# Patient Record
Sex: FEMALE | Race: WHITE | NOT HISPANIC OR LATINO | Employment: OTHER | URBAN - METROPOLITAN AREA
[De-identification: names, ages, dates, MRNs, and addresses within clinical notes are randomized per-mention and may not be internally consistent; named-entity substitution may affect disease eponyms.]

---

## 2017-02-16 ENCOUNTER — APPOINTMENT (OUTPATIENT)
Dept: LAB | Facility: CLINIC | Age: 77
End: 2017-02-16
Payer: MEDICARE

## 2017-02-16 ENCOUNTER — TRANSCRIBE ORDERS (OUTPATIENT)
Dept: LAB | Facility: CLINIC | Age: 77
End: 2017-02-16

## 2017-02-16 DIAGNOSIS — K28.9 GASTROINTESTINAL ULCER: ICD-10-CM

## 2017-02-16 DIAGNOSIS — E03.9 UNSPECIFIED HYPOTHYROIDISM: Primary | ICD-10-CM

## 2017-02-16 LAB
ALBUMIN SERPL BCP-MCNC: 3.7 G/DL (ref 3.5–5)
ALP SERPL-CCNC: 78 U/L (ref 46–116)
ALT SERPL W P-5'-P-CCNC: 23 U/L (ref 12–78)
ANION GAP SERPL CALCULATED.3IONS-SCNC: 6 MMOL/L (ref 4–13)
AST SERPL W P-5'-P-CCNC: 15 U/L (ref 5–45)
BILIRUB SERPL-MCNC: 0.28 MG/DL (ref 0.2–1)
BUN SERPL-MCNC: 12 MG/DL (ref 5–25)
CALCIUM SERPL-MCNC: 9.1 MG/DL (ref 8.3–10.1)
CHLORIDE SERPL-SCNC: 105 MMOL/L (ref 100–108)
CO2 SERPL-SCNC: 30 MMOL/L (ref 21–32)
CREAT SERPL-MCNC: 0.9 MG/DL (ref 0.6–1.3)
ERYTHROCYTE [DISTWIDTH] IN BLOOD BY AUTOMATED COUNT: 15.4 % (ref 11.6–15.1)
GFR SERPL CREATININE-BSD FRML MDRD: >60 ML/MIN/1.73SQ M
GLUCOSE SERPL-MCNC: 99 MG/DL (ref 65–140)
HCT VFR BLD AUTO: 38.1 % (ref 34.8–46.1)
HGB BLD-MCNC: 12.2 G/DL (ref 11.5–15.4)
MCH RBC QN AUTO: 29 PG (ref 26.8–34.3)
MCHC RBC AUTO-ENTMCNC: 32 G/DL (ref 31.4–37.4)
MCV RBC AUTO: 91 FL (ref 82–98)
PLATELET # BLD AUTO: 400 THOUSANDS/UL (ref 149–390)
PMV BLD AUTO: 10.7 FL (ref 8.9–12.7)
POTASSIUM SERPL-SCNC: 4.2 MMOL/L (ref 3.5–5.3)
PROT SERPL-MCNC: 7.5 G/DL (ref 6.4–8.2)
RBC # BLD AUTO: 4.2 MILLION/UL (ref 3.81–5.12)
SODIUM SERPL-SCNC: 141 MMOL/L (ref 136–145)
T4 FREE SERPL-MCNC: 1.07 NG/DL (ref 0.76–1.46)
TSH SERPL DL<=0.05 MIU/L-ACNC: 0.88 UIU/ML (ref 0.36–3.74)
WBC # BLD AUTO: 4.74 THOUSAND/UL (ref 4.31–10.16)

## 2017-02-16 PROCEDURE — 36415 COLL VENOUS BLD VENIPUNCTURE: CPT

## 2017-02-16 PROCEDURE — 84443 ASSAY THYROID STIM HORMONE: CPT

## 2017-02-16 PROCEDURE — 85027 COMPLETE CBC AUTOMATED: CPT

## 2017-02-16 PROCEDURE — 84439 ASSAY OF FREE THYROXINE: CPT

## 2017-02-16 PROCEDURE — 80053 COMPREHEN METABOLIC PANEL: CPT

## 2017-10-24 ENCOUNTER — APPOINTMENT (OUTPATIENT)
Dept: LAB | Facility: CLINIC | Age: 77
End: 2017-10-24
Payer: MEDICARE

## 2017-10-24 DIAGNOSIS — I51.9 MYXEDEMA HEART DISEASE: Primary | ICD-10-CM

## 2017-10-24 DIAGNOSIS — E03.9 MYXEDEMA HEART DISEASE: Primary | ICD-10-CM

## 2017-10-24 LAB
T4 FREE SERPL-MCNC: 0.8 NG/DL (ref 0.76–1.46)
TSH SERPL DL<=0.05 MIU/L-ACNC: 2.02 UIU/ML (ref 0.36–3.74)

## 2017-10-24 PROCEDURE — 36415 COLL VENOUS BLD VENIPUNCTURE: CPT

## 2017-10-24 PROCEDURE — 84439 ASSAY OF FREE THYROXINE: CPT

## 2017-10-24 PROCEDURE — 84443 ASSAY THYROID STIM HORMONE: CPT

## 2018-07-26 ENCOUNTER — OFFICE VISIT (OUTPATIENT)
Dept: LAB | Facility: HOSPITAL | Age: 78
End: 2018-07-26
Payer: MEDICARE

## 2018-07-26 ENCOUNTER — TRANSCRIBE ORDERS (OUTPATIENT)
Dept: ADMINISTRATIVE | Facility: HOSPITAL | Age: 78
End: 2018-07-26

## 2018-07-26 ENCOUNTER — APPOINTMENT (OUTPATIENT)
Dept: LAB | Facility: HOSPITAL | Age: 78
End: 2018-07-26
Payer: MEDICARE

## 2018-07-26 DIAGNOSIS — Z01.818 PREOP TESTING: ICD-10-CM

## 2018-07-26 DIAGNOSIS — Z01.818 PREOP TESTING: Primary | ICD-10-CM

## 2018-07-26 LAB
ALBUMIN SERPL BCP-MCNC: 3.2 G/DL (ref 3.5–5)
ALP SERPL-CCNC: 83 U/L (ref 46–116)
ALT SERPL W P-5'-P-CCNC: 19 U/L (ref 12–78)
ANION GAP SERPL CALCULATED.3IONS-SCNC: 7 MMOL/L (ref 4–13)
AST SERPL W P-5'-P-CCNC: 18 U/L (ref 5–45)
BASOPHILS # BLD AUTO: 0.06 THOUSANDS/ΜL (ref 0–0.1)
BASOPHILS NFR BLD AUTO: 1 % (ref 0–1)
BILIRUB SERPL-MCNC: 0.3 MG/DL (ref 0.2–1)
BUN SERPL-MCNC: 11 MG/DL (ref 5–25)
CALCIUM SERPL-MCNC: 8.5 MG/DL (ref 8.3–10.1)
CHLORIDE SERPL-SCNC: 105 MMOL/L (ref 100–108)
CO2 SERPL-SCNC: 30 MMOL/L (ref 21–32)
CREAT SERPL-MCNC: 0.92 MG/DL (ref 0.6–1.3)
EOSINOPHIL # BLD AUTO: 0.32 THOUSAND/ΜL (ref 0–0.61)
EOSINOPHIL NFR BLD AUTO: 7 % (ref 0–6)
ERYTHROCYTE [DISTWIDTH] IN BLOOD BY AUTOMATED COUNT: 16.1 % (ref 11.6–15.1)
GFR SERPL CREATININE-BSD FRML MDRD: 60 ML/MIN/1.73SQ M
GLUCOSE P FAST SERPL-MCNC: 86 MG/DL (ref 65–99)
HCT VFR BLD AUTO: 37.5 % (ref 34.8–46.1)
HGB BLD-MCNC: 11.6 G/DL (ref 11.5–15.4)
IMM GRANULOCYTES # BLD AUTO: 0.02 THOUSAND/UL (ref 0–0.2)
IMM GRANULOCYTES NFR BLD AUTO: 0 % (ref 0–2)
LYMPHOCYTES # BLD AUTO: 1.84 THOUSANDS/ΜL (ref 0.6–4.47)
LYMPHOCYTES NFR BLD AUTO: 38 % (ref 14–44)
MCH RBC QN AUTO: 28.5 PG (ref 26.8–34.3)
MCHC RBC AUTO-ENTMCNC: 30.9 G/DL (ref 31.4–37.4)
MCV RBC AUTO: 92 FL (ref 82–98)
MONOCYTES # BLD AUTO: 0.79 THOUSAND/ΜL (ref 0.17–1.22)
MONOCYTES NFR BLD AUTO: 16 % (ref 4–12)
NEUTROPHILS # BLD AUTO: 1.88 THOUSANDS/ΜL (ref 1.85–7.62)
NEUTS SEG NFR BLD AUTO: 38 % (ref 43–75)
NRBC BLD AUTO-RTO: 0 /100 WBCS
PLATELET # BLD AUTO: 392 THOUSANDS/UL (ref 149–390)
PMV BLD AUTO: 10.4 FL (ref 8.9–12.7)
POTASSIUM SERPL-SCNC: 3.6 MMOL/L (ref 3.5–5.3)
PROT SERPL-MCNC: 6.8 G/DL (ref 6.4–8.2)
RBC # BLD AUTO: 4.07 MILLION/UL (ref 3.81–5.12)
SODIUM SERPL-SCNC: 142 MMOL/L (ref 136–145)
WBC # BLD AUTO: 4.91 THOUSAND/UL (ref 4.31–10.16)

## 2018-07-26 PROCEDURE — 93005 ELECTROCARDIOGRAM TRACING: CPT

## 2018-07-26 PROCEDURE — 80053 COMPREHEN METABOLIC PANEL: CPT | Performed by: ORTHOPAEDIC SURGERY

## 2018-07-26 PROCEDURE — 85025 COMPLETE CBC W/AUTO DIFF WBC: CPT | Performed by: ORTHOPAEDIC SURGERY

## 2018-07-26 PROCEDURE — 36415 COLL VENOUS BLD VENIPUNCTURE: CPT | Performed by: ORTHOPAEDIC SURGERY

## 2018-07-27 LAB
ATRIAL RATE: 53 BPM
P AXIS: 39 DEGREES
PR INTERVAL: 132 MS
QRS AXIS: 27 DEGREES
QRSD INTERVAL: 80 MS
QT INTERVAL: 474 MS
QTC INTERVAL: 444 MS
T WAVE AXIS: 42 DEGREES
VENTRICULAR RATE: 53 BPM

## 2018-07-27 PROCEDURE — 93010 ELECTROCARDIOGRAM REPORT: CPT | Performed by: INTERNAL MEDICINE

## 2019-01-03 ENCOUNTER — EVALUATION (OUTPATIENT)
Dept: PHYSICAL THERAPY | Facility: CLINIC | Age: 79
End: 2019-01-03
Payer: MEDICARE

## 2019-01-03 DIAGNOSIS — M25.561 RIGHT KNEE PAIN, UNSPECIFIED CHRONICITY: ICD-10-CM

## 2019-01-03 DIAGNOSIS — Z96.651 HISTORY OF TOTAL RIGHT KNEE REPLACEMENT: Primary | ICD-10-CM

## 2019-01-03 PROCEDURE — G8978 MOBILITY CURRENT STATUS: HCPCS

## 2019-01-03 PROCEDURE — 97161 PT EVAL LOW COMPLEX 20 MIN: CPT

## 2019-01-03 PROCEDURE — G8979 MOBILITY GOAL STATUS: HCPCS

## 2019-01-03 NOTE — PROGRESS NOTES
PT Evaluation     Today's date: 1/3/2019  Patient name: Gissel Bonner  : 1940  MRN: [id number]  Referring provider: Everardo Kamara MD  Dx:   Encounter Diagnosis     ICD-10-CM    1  History of total right knee replacement Z96 651    2  Right knee pain, unspecified chronicity M25 561                   Assessment  Assessment details: Gissel Bonner is a 77 y o  female who presents with pain, decreased strength, decreased ROM, decreased joint mobility and ambulatory dysfunction  Due to these impairments, patient has difficulty performing ADL's, recreational activities, ambulation, stair negotiation, transfers  Patient's clinical presentation is consistent with their referring diagnosis of History of total right knee replacement  (primary encounter diagnosis)    Right knee pain, unspecified chronicity    Patient has been educated in home exercise program and plan of care   Patient would benefit from skilled physical therapy services to address their aforementioned functional limitations and progress towards prior level of function and independence with home exercise program      Impairments: abnormal gait, abnormal or restricted ROM, activity intolerance, impaired physical strength, lacks appropriate home exercise program, pain with function, weight-bearing intolerance, poor posture  and poor body mechanics    Goals  Short Term Goals to be accomplished in 4 weeks: (19)  STG1: Pt will be I with HEP  STG2: Pt will demo 50% inc in knee AROM  STG3: Pt will demo 1/2 MMT strength in knee   STG4: Pt will amb community distance without gait deviates due to pain     Long Term Goals to be accomplished by 4/3/19:   LTG1: Pt will demo knee strength WNL to return to PLOF  LTG2: Pt will demo knee AROM WNL to minimize gait deviations  LTG3: Pt will return to household ADLs and work duties pain free as per [facility] details: HEP development, stretching, strengthening, A/AA/PROM, joint mobilizations, posture education, STM/MI as needed to reduce muscle tension, muscle reeducation, PLOC discussed and agreed upon with patient  Patient would benefit from: PT eval and skilled physical therapy  Planned modality interventions: cryotherapy and thermotherapy: hydrocollator packs  Planned therapy interventions: manual therapy, neuromuscular re-education, self care, therapeutic activities, therapeutic exercise, home exercise program and patient education  Frequency: 3x week (2-3x)  Plan of Care beginning date: 1/3/2019  Plan of Care expiration date: 4/3/2019  Treatment plan discussed with: patient        Subjective Evaluation    History of Present Illness  Date of surgery: 2018  Mechanism of injury: surgery  Mechanism of injury: Pt presents to PT s/p R TKA performed on 18  Pt reports she spent 1 week at [employer]  States she fell 1 year ago while dancing, pain increased over the next several months leading to a dec in mobility and activity tolerance  Reports she has had 1 injection and 1 surgical procedure prior to TKA (which she cannot recall name of)   Had follow up with surgeon today, X-ray clear, doing well per MD  Returns for next follow up 19  Quality of life: good    Pain  Current pain ratin  At best pain ratin  At worst pain rating: 10  Location: posterior knee, quad  Quality: dull ache, discomfort and tight  Relieving factors: change in position and rest (rub the area )  Aggravating factors: walking, standing, sitting and stair climbing (pain worse at night, sleeping, transfers, bending/straightening knee )    Social Support  Steps to enter house: yes (4 to get inside)    Employment status: working (desk work)  Hand dominance: right    Treatments  Previous treatment: injection treatment (no relief)  Discharged from (in last 30 days): inpatient hospitalization  Discharged from (in last 30 days) comments: [facility]       Patient Goals  Patient goals for therapy: decreased pain, increased motion, increased strength, independence with ADLs/IADLs and return to sport/leisure activities  Patient goal: Dancing, return to PLOF  Objective    Flowsheet Rows      Most Recent Value   PT/OT G-Codes   Current Score  38   Projected Score  65   FOTO information reviewed  Yes   Assessment Type  Evaluation   G code set  Mobility: Walking & Moving Around   Mobility: Walking and Moving Around Current Status ()  CK   Mobility: Walking and Moving Around Goal Status ()  CJ      A/PROM   R   L  Knee flex sup   80/90*   130  Knee ext   10/5*   0    Strength   R   L  Knee flex   3/5*   5/5  Knee ext   3-/5*   5/5  Hip flex    3+/5*   5/5  Hip Abd   3+/5   5/5  Hip ext    4-/5   5/5  Hip add   4-/5   5/5    Balance:   NT today    Function:   Stairs: non reciprocal w/ handrail  Squatting: unable  Ambulation: Antalgic, dec TKE/heel strike w/ IC, dec knee flex during swing thru  Pt is working, desk duties  Observation: Incision well healing and intact, areas of redness and scabbing t/o distal portion  Edema noted t/o knee most significantly supra-patellar region  Palpation/Joint mobility: (+) TTP posterior knee, hamstring tendon insertion  Restricted patellar mobility noted all directions  Precautions: Standard  Allergy to celebrex       Daily Treatment Diary     Visit #1    HEP: Quad set, SLR w/ quad set, calf stretch w/ towel, ankle pumps

## 2019-01-03 NOTE — LETTER
2019    Vicky [phone]    Patient: Mihaela Alvarez   YOB: 1940   Date of Visit: 1/3/2019     Encounter Diagnosis     ICD-10-CM    1  History of total right knee replacement Z96 651    2  Right knee pain, unspecified chronicity M25 561        Dear Dr Ary Cockayne:    Please review the attached Plan of Care from [facility] recent visit  Please verify that you agree therapy should continue by signing the attached document and sending it back to our office  If you have any questions or concerns, please don't hesitate to call  Sincerely,    Thiago Taveras, PT      Referring Provider:      I certify that I have read the below Plan of Care and certify the need for these services furnished under this plan of treatment while under my care  Sherman Rice MD  [address] [place] [address] Avenue: [phone]          PT Evaluation     Today's date: 1/3/2019  Patient name: Mihaela Alvarez  : 1940  MRN: [id number]  Referring provider: Jonny Dennis MD  Dx:   Encounter Diagnosis     ICD-10-CM    1  History of total right knee replacement Z96 651    2  Right knee pain, unspecified chronicity M25 561                   Assessment  Assessment details: Mihaela Alvarez is a 77 y o  female who presents with pain, decreased strength, decreased ROM, decreased joint mobility and ambulatory dysfunction  Due to these impairments, patient has difficulty performing ADL's, recreational activities, ambulation, stair negotiation, transfers  Patient's clinical presentation is consistent with their referring diagnosis of History of total right knee replacement  (primary encounter diagnosis)    Right knee pain, unspecified chronicity    Patient has been educated in home exercise program and plan of care   Patient would benefit from skilled physical therapy services to address their aforementioned functional limitations and progress towards prior level of function and independence with home exercise program      Impairments: abnormal gait, abnormal or restricted ROM, activity intolerance, impaired physical strength, lacks appropriate home exercise program, pain with function, weight-bearing intolerance, poor posture  and poor body mechanics    Goals  Short Term Goals to be accomplished in 4 weeks: (1/31/19)  STG1: Pt will be I with HEP  STG2: Pt will demo 50% inc in knee AROM  STG3: Pt will demo 1/2 MMT strength in knee   STG4: Pt will amb community distance without gait deviates due to pain     Long Term Goals to be accomplished by 4/3/19:   LTG1: Pt will demo knee strength WNL to return to PLOF  LTG2: Pt will demo knee AROM WNL to minimize gait deviations  LTG3: Pt will return to household ADLs and work duties pain free as per [facility] details: HEP development, stretching, strengthening, A/AA/PROM, joint mobilizations, posture education, STM/MI as needed to reduce muscle tension, muscle reeducation, PLOC discussed and agreed upon with patient  Patient would benefit from: PT eval and skilled physical therapy  Planned modality interventions: cryotherapy and thermotherapy: hydrocollator packs  Planned therapy interventions: manual therapy, neuromuscular re-education, self care, therapeutic activities, therapeutic exercise, home exercise program and patient education  Frequency: 3x week (2-3x)  Plan of Care beginning date: 1/3/2019  Plan of Care expiration date: 4/3/2019  Treatment plan discussed with: patient        Subjective Evaluation    History of Present Illness  Date of surgery: 12/5/2018  Mechanism of injury: surgery  Mechanism of injury: Pt presents to PT s/p R TKA performed on 12/5/18  Pt reports she spent 1 week at [employer]  States she fell 1 year ago while dancing, pain increased over the next several months leading to a dec in mobility and activity tolerance   Reports she has had 1 injection and 1 surgical procedure prior to TKA (which she cannot recall name of)   Had follow up with surgeon today, X-ray clear, doing well per MD  Returns for next follow up 19  Quality of life: good    Pain  Current pain ratin  At best pain ratin  At worst pain rating: 10  Location: posterior knee, quad  Quality: dull ache, discomfort and tight  Relieving factors: change in position and rest (rub the area )  Aggravating factors: walking, standing, sitting and stair climbing (pain worse at night, sleeping, transfers, bending/straightening knee )    Social Support  Steps to enter house: yes (4 to get inside)    Employment status: working (desk work)  Hand dominance: right    Treatments  Previous treatment: injection treatment (no relief)  Discharged from (in last 30 days): inpatient hospitalization  Discharged from (in last 30 days) comments: [facility]  Patient Goals  Patient goals for therapy: decreased pain, increased motion, increased strength, independence with ADLs/IADLs and return to sport/leisure activities  Patient goal: Dancing, return to PLOF  Objective    Flowsheet Rows      Most Recent Value   PT/OT G-Codes   Current Score  38   Projected Score  65   FOTO information reviewed  Yes   Assessment Type  Evaluation   G code set  Mobility: Walking & Moving Around   Mobility: Walking and Moving Around Current Status ()  CK   Mobility: Walking and Moving Around Goal Status ()  CJ      A/PROM   R   L  Knee flex sup   80/90*   130  Knee ext   10/5*   0    Strength   R   L  Knee flex   3/5*   5/5  Knee ext   3-/5*   5/5  Hip flex    3+/5*   5/5  Hip Abd   3+/5   5/5  Hip ext    4-/5   5/5  Hip add   4-/5   5/5    Balance:   NT today    Function:   Stairs: non reciprocal w/ handrail  Squatting: unable  Ambulation: Antalgic, dec TKE/heel strike w/ IC, dec knee flex during swing thru  Pt is working, desk duties       Observation: Incision well healing and intact, areas of redness and scabbing t/o distal portion  Edema noted t/o knee most significantly supra-patellar region  Palpation/Joint mobility: (+) TTP posterior knee, hamstring tendon insertion  Restricted patellar mobility noted all directions  Precautions: Standard  Allergy to celebrex       Daily Treatment Diary     Visit #1    HEP: Quad set, SLR w/ quad set, calf stretch w/ towel, ankle pumps

## 2019-01-04 ENCOUNTER — TRANSCRIBE ORDERS (OUTPATIENT)
Dept: PHYSICAL THERAPY | Facility: CLINIC | Age: 79
End: 2019-01-04

## 2019-01-04 DIAGNOSIS — Z96.651 STATUS POST UNICOMPARTMENTAL KNEE REPLACEMENT, RIGHT: Primary | ICD-10-CM

## 2019-01-07 ENCOUNTER — OFFICE VISIT (OUTPATIENT)
Dept: PHYSICAL THERAPY | Facility: CLINIC | Age: 79
End: 2019-01-07
Payer: MEDICARE

## 2019-01-07 DIAGNOSIS — M25.561 RIGHT KNEE PAIN, UNSPECIFIED CHRONICITY: ICD-10-CM

## 2019-01-07 DIAGNOSIS — Z96.651 HISTORY OF TOTAL RIGHT KNEE REPLACEMENT: Primary | ICD-10-CM

## 2019-01-07 PROCEDURE — 97110 THERAPEUTIC EXERCISES: CPT

## 2019-01-07 NOTE — PROGRESS NOTES
Daily Note     Today's date: 2019  Patient name: Madison Dwyer  : 1940  MRN: [id number]  Referring provider: Zander Piedra MD  Dx:   Encounter Diagnosis     ICD-10-CM    1  History of total right knee replacement Z96 651    2  Right knee pain, unspecified chronicity M25 561               Subjective: "Always have pain and stiffness but I feel like I'm doing pretty good  "    Objective: See treatment diary below    Daily Treatment Diary     Visit #2    TherEx: 15'  Sup quad set w/ towel 2x10/5"  Gastroc str w/ SOS 10"x10  SAQ w/ foam roll 2x10/3"  Heel slides w/ SOS 10"x10  SLR abd/flex w/ Qset 4x5  Sup hip add w/ ball     CP 5' to end    HEP: Cont quad set, SLR w/ quad set, calf stretch w/ towel, ankle pumps     Assessment: Tolerated treatment well and w/ good liset to initation of POC  Patient demonstrated fatigue post treatment, exhibited good technique with therapeutic exercises and would benefit from continued PT      Plan: Continue per plan of care

## 2019-01-09 ENCOUNTER — OFFICE VISIT (OUTPATIENT)
Dept: PHYSICAL THERAPY | Facility: CLINIC | Age: 79
End: 2019-01-09
Payer: MEDICARE

## 2019-01-09 DIAGNOSIS — M25.561 RIGHT KNEE PAIN, UNSPECIFIED CHRONICITY: ICD-10-CM

## 2019-01-09 DIAGNOSIS — Z96.651 HISTORY OF TOTAL RIGHT KNEE REPLACEMENT: Primary | ICD-10-CM

## 2019-01-09 PROCEDURE — 97110 THERAPEUTIC EXERCISES: CPT

## 2019-01-09 PROCEDURE — 97140 MANUAL THERAPY 1/> REGIONS: CPT

## 2019-01-09 NOTE — PROGRESS NOTES
Daily Note     Today's date: 2019  Patient name: Cherilyn Sever  : 1940  MRN: [id number]  Referring provider: Diego Cristina MD  Dx:   Encounter Diagnosis     ICD-10-CM    1  History of total right knee replacement Z96 651    2  Right knee pain, unspecified chronicity M25 561               Subjective:  "I'm wonderful today, but stiff "     Objective: See treatment diary below    Heel slide w/ SOS knee flexion [id number]    Daily Treatment Diary     Visit #3    TherEx: 25'  Recumbent Bike for ROM 5'   LAQ 15x3"  Self assist flex stretch seated off table 10"x10  SAQ w/ foam roll 2x10/3"  Heel slides w/ SOS 20"x3  Sup quad set w/ towel 2x10/5"  Gastroc str w/ SOS 10"x10  Sup hip add w/ ball 5"x20    Manual: 15'  Patella mob all directions 5'  Extension mob/PROM flex/ext 5'      HEP: Added seated self assist flexion stretch and LAQ  Assessment: Tolerated treatment well  Demo steady gains w/ flexion ROM but cont to lack full knee extension leading to gait deviations and discomfort in posterior knee  Patient demonstrated fatigue post treatment, exhibited good technique with therapeutic exercises and would benefit from continued PT      Plan: Continue per plan of care

## 2019-01-10 ENCOUNTER — OFFICE VISIT (OUTPATIENT)
Dept: PHYSICAL THERAPY | Facility: CLINIC | Age: 79
End: 2019-01-10
Payer: MEDICARE

## 2019-01-10 DIAGNOSIS — Z96.651 HISTORY OF TOTAL RIGHT KNEE REPLACEMENT: Primary | ICD-10-CM

## 2019-01-10 DIAGNOSIS — M25.561 RIGHT KNEE PAIN, UNSPECIFIED CHRONICITY: ICD-10-CM

## 2019-01-10 PROCEDURE — 97110 THERAPEUTIC EXERCISES: CPT

## 2019-01-10 NOTE — PROGRESS NOTES
Daily Note     Today's date: 1/10/2019  Patient name: Jayda Moeller  : 1940  MRN: [id number]  Referring provider: Alex Ocampo MD  Dx:   Encounter Diagnosis     ICD-10-CM    1  History of total right knee replacement Z96 651    2  Right knee pain, unspecified chronicity M25 561               Subjective:  Pt reports "not having a good morning so far  I didn't sleep and my pain has been worse, I think I need to take more time off from work "     Objective: See treatment diary below      Daily Treatment Diary     Visit #4    TherEx: 25'  Recumbent Bike for ROM 5'   LAQ 2x10/3"  Self assist flex stretch seated off table 10"x10  SAQ w/ foam roll 2x10/3"  Heel slides w/ SOS 2x10/5"  Gastroc str w/ SOS 10"x10  SLR w/ Qset 2x10/5"  Sup hip add w/ ball 5"x20    CP 10' to end  Skin intact pre/post    HEP: Cont seated self assist flexion stretch and LAQ  Assessment: Tolerated treatment well  Inc discomfort reported t/o session today  Patient demonstrated fatigue post treatment, exhibited good technique with therapeutic exercises and would benefit from continued PT      Plan: Continue per plan of care

## 2019-01-15 ENCOUNTER — OFFICE VISIT (OUTPATIENT)
Dept: PHYSICAL THERAPY | Facility: CLINIC | Age: 79
End: 2019-01-15
Payer: MEDICARE

## 2019-01-15 DIAGNOSIS — M25.561 RIGHT KNEE PAIN, UNSPECIFIED CHRONICITY: ICD-10-CM

## 2019-01-15 DIAGNOSIS — Z96.651 HISTORY OF TOTAL RIGHT KNEE REPLACEMENT: Primary | ICD-10-CM

## 2019-01-15 PROCEDURE — 97530 THERAPEUTIC ACTIVITIES: CPT

## 2019-01-15 PROCEDURE — 97110 THERAPEUTIC EXERCISES: CPT

## 2019-01-15 NOTE — PROGRESS NOTES
Daily Note     Today's date: 1/15/2019  Patient name: Real Marrow  : 1940  MRN: [id number]  Referring provider: Junior Edvin MD  Dx:   Encounter Diagnosis     ICD-10-CM    1  History of total right knee replacement Z96 651    2  Right knee pain, unspecified chronicity M25 561               Subjective:  Pt reports "not having a good morning so far  I didn't sleep and my pain has been worse, I think I need to take more time off from work "     Objective: See treatment diary below      Daily Treatment Diary     Visit #5    TherEx: 25'  LAQ 2x10/3"  Self assist flex stretch seated off table 10"x10  Quad set w/ towel under ankle 2x10/5"  SAQ w/ foam roll 2x10/3" 2#  Bridges 2x10/5"  Sup hip add w/ ball 5"x20  Supine clamshell GTB 2x10  SLR w/ Qset 2x10/5"  Stand hip abd/ext  YTB 2x10 each    TherAct: 15'  Recumbent Bike for ROM 5' seat 10  Wobble board A/P 30x  Heel raise/Toe raise 20x    CP 5' to end  Skin intact pre/post    HEP: Cont seated self assist flexion stretch and LAQ  Assessment: Tolerated treatment well  Able to perform full revolution on bike  Patient demonstrated fatigue post treatment, exhibited good technique with therapeutic exercises and would benefit from continued PT      Plan: Continue per plan of care

## 2019-01-16 ENCOUNTER — APPOINTMENT (OUTPATIENT)
Dept: PHYSICAL THERAPY | Facility: CLINIC | Age: 79
End: 2019-01-16
Payer: MEDICARE

## 2019-01-17 ENCOUNTER — OFFICE VISIT (OUTPATIENT)
Dept: PHYSICAL THERAPY | Facility: CLINIC | Age: 79
End: 2019-01-17
Payer: MEDICARE

## 2019-01-17 DIAGNOSIS — M25.561 RIGHT KNEE PAIN, UNSPECIFIED CHRONICITY: ICD-10-CM

## 2019-01-17 DIAGNOSIS — Z96.651 HISTORY OF TOTAL RIGHT KNEE REPLACEMENT: Primary | ICD-10-CM

## 2019-01-17 PROCEDURE — 97110 THERAPEUTIC EXERCISES: CPT

## 2019-01-17 PROCEDURE — 97530 THERAPEUTIC ACTIVITIES: CPT

## 2019-01-17 NOTE — PROGRESS NOTES
Daily Note     Today's date: 2019  Patient name: Rosa Villegas  : 1940  MRN: [id number]  Referring provider: Luis Kaiser MD  Dx:   Encounter Diagnosis     ICD-10-CM    1  History of total right knee replacement Z96 651    2  Right knee pain, unspecified chronicity M25 561               Subjective:  Pt reports "my knee feels better today, just stiff "    Objective: See treatment diary below      Daily Treatment Diary     Visit #6    TherEx: 15'  Stand hip abd/ext  YTB 2x10 each  LAQ 2x10/3" 2#  Self assist flex stretch seated off table 10"x10  SAQ w/ foam roll 2x15/3" 2#  Bridges 2x10/5"  SLR flex /abd  2x10/5"  Clamshells  GTB    TherAct: 15'  Recumbent Bike for ROM 7' seat 10  Wobble board A/P 30x  Heel raise/Toe raise 20x  STS @ rail w/ airex     CP 5' to end  Skin intact pre/post    HEP: Cont seated self assist flexion stretch and LAQ  Assessment: Tolerated treatment well  Patient demonstrated fatigue post treatment, exhibited good technique with therapeutic exercises and would benefit from continued PT      Plan: Continue per plan of care

## 2019-01-22 ENCOUNTER — OFFICE VISIT (OUTPATIENT)
Dept: PHYSICAL THERAPY | Facility: CLINIC | Age: 79
End: 2019-01-22
Payer: MEDICARE

## 2019-01-22 DIAGNOSIS — M25.561 RIGHT KNEE PAIN, UNSPECIFIED CHRONICITY: ICD-10-CM

## 2019-01-22 DIAGNOSIS — Z96.651 HISTORY OF TOTAL RIGHT KNEE REPLACEMENT: Primary | ICD-10-CM

## 2019-01-22 PROCEDURE — 97530 THERAPEUTIC ACTIVITIES: CPT

## 2019-01-22 PROCEDURE — 97110 THERAPEUTIC EXERCISES: CPT

## 2019-01-22 NOTE — PROGRESS NOTES
Daily Note     Today's date: 2019  Patient name: Xochilt Son  : 1940  MRN: [id number]  Referring provider: Daniel Page MD  Dx:   Encounter Diagnosis     ICD-10-CM    1  History of total right knee replacement Z96 651    2  Right knee pain, unspecified chronicity M25 561               Subjective:  Pt reports her knee feels cold and stiff  Hasn't been doing stretches at home  Objective: See treatment diary below      Daily Treatment Diary     Visit #7    TherEx: 15'  Stand hip abd/ext YTB 2x10 each  LAQ 2x10/3" 2#  Self assist flex stretch seated off table 10"x10  Quad set w/ roll under ankle 2x10/5"    TherAct: 15'  Recumbent Bike for ROM 7' seat 10  Wobble board A/P 30x  Heel raise/Toe raise 20x  Forward step up 6" 10x each no UE  STS @ rail w/ airex 2x10    MH w/ heel prop 5' to end  Skin intact pre/post    HEP: Cont seated self assist flexion stretch and LAQ  Assessment: Tolerated treatment well  Shortened session secondary to patient time constraints  Cont to lack full knee extension  Patient demonstrated fatigue post treatment, exhibited good technique with therapeutic exercises and would benefit from continued PT      Plan: Continue per plan of care

## 2019-01-23 ENCOUNTER — OFFICE VISIT (OUTPATIENT)
Dept: PHYSICAL THERAPY | Facility: CLINIC | Age: 79
End: 2019-01-23
Payer: MEDICARE

## 2019-01-23 DIAGNOSIS — M25.561 RIGHT KNEE PAIN, UNSPECIFIED CHRONICITY: ICD-10-CM

## 2019-01-23 DIAGNOSIS — Z96.651 HISTORY OF TOTAL RIGHT KNEE REPLACEMENT: Primary | ICD-10-CM

## 2019-01-23 PROCEDURE — 97110 THERAPEUTIC EXERCISES: CPT

## 2019-01-23 NOTE — PROGRESS NOTES
Daily Note     Today's date: 2019  Patient name: Asmita Lipscomb  : 1940  MRN: [id number]  Referring provider: Dorann Schwab, MD  Dx:   Encounter Diagnosis     ICD-10-CM    1  History of total right knee replacement Z96 651    2  Right knee pain, unspecified chronicity M25 561               Subjective:  Pt reports inc posterior knee discomfort today  "I wore ankle boots yesterday for the first time, that may have done it "    Objective: See treatment diary below      Daily Treatment Diary     Visit #8    TherEx: 30'  Stand hip abd/ext YTB 2x10 each  LAQ 2x10/3" 2#  Self assist flex stretch seated off table 10"x10  Quad set w/ roll under ankle 2x10/5"  Heel slides w/ SOS 10"x10  Calf stretch w/ SOS 30"x3  SLR flex w/ Qset 2x10    MH knee 10' to start  Skin intact pre/post    HEP: Cont seated self assist flexion stretch and LAQ  Assessment: Tolerated treatment well  Modified session today due to patient c/o inc discomfort  Cont extensor lag w/ SLR, VC and TC to quad set  Patient demonstrated fatigue post treatment, exhibited good technique with therapeutic exercises and would benefit from continued PT      Plan: Continue per plan of care

## 2019-01-29 ENCOUNTER — OFFICE VISIT (OUTPATIENT)
Dept: PHYSICAL THERAPY | Facility: CLINIC | Age: 79
End: 2019-01-29
Payer: MEDICARE

## 2019-01-29 DIAGNOSIS — M25.561 RIGHT KNEE PAIN, UNSPECIFIED CHRONICITY: ICD-10-CM

## 2019-01-29 DIAGNOSIS — Z96.651 HISTORY OF TOTAL RIGHT KNEE REPLACEMENT: Primary | ICD-10-CM

## 2019-01-29 PROCEDURE — 97110 THERAPEUTIC EXERCISES: CPT

## 2019-01-29 NOTE — PROGRESS NOTES
Daily Note     Today's date: 2019  Patient name: Cherilyn Sever  : 1940  MRN: [id number]  Referring provider: Diego Cristina MD  Dx:   Encounter Diagnosis     ICD-10-CM    1  History of total right knee replacement Z96 651    2  Right knee pain, unspecified chronicity M25 561               Subjective:  Pt reports less posterior knee pain and improved mobility  Objective: See treatment diary below      Daily Treatment Diary     Visit #9    TherEx: 30'  Bike 5'  Standing gastroc str 30"x3  Stand hip abd/ext YTB 2x10 each  FSU 6" 2x10  Wobble board A/P   LAQ 2x10/3" 2#  Self assist flex stretch seated off table 10"x10  SLR flex w/ Qset 2x10 2#  Bridges 2x10    HEP: Cont seated self assist flexion stretch and LAQ  Assessment: Tolerated treatment well  Improved tolerance to session today  Patient demonstrated fatigue post treatment, exhibited good technique with therapeutic exercises and would benefit from continued PT      Plan: Continue per plan of care

## 2019-01-30 ENCOUNTER — APPOINTMENT (OUTPATIENT)
Dept: PHYSICAL THERAPY | Facility: CLINIC | Age: 79
End: 2019-01-30
Payer: MEDICARE

## 2019-01-31 ENCOUNTER — OFFICE VISIT (OUTPATIENT)
Dept: PHYSICAL THERAPY | Facility: CLINIC | Age: 79
End: 2019-01-31
Payer: MEDICARE

## 2019-01-31 DIAGNOSIS — M25.561 RIGHT KNEE PAIN, UNSPECIFIED CHRONICITY: ICD-10-CM

## 2019-01-31 DIAGNOSIS — Z96.651 HISTORY OF TOTAL RIGHT KNEE REPLACEMENT: Primary | ICD-10-CM

## 2019-01-31 PROCEDURE — 97110 THERAPEUTIC EXERCISES: CPT

## 2019-01-31 NOTE — PROGRESS NOTES
PT Re-Evaluation     Today's date: 2019  Patient name: Tyler Costello  : 1940  MRN: [id number]  Referring provider: Eden Crystal MD  Dx:   Encounter Diagnosis     ICD-10-CM    1  History of total right knee replacement Z96 651    2  Right knee pain, unspecified chronicity M25 561               Assessment  Assessment details: Tyler Costello is a 77 y o  female who has been regularly partcipating in skilled PT since time of Ie and has demo steady progress towards est LTG's  Pt cont to present with pain, decreased strength, decreased ROM, decreased joint mobility and ambulatory dysfunction  Pt has demo steady gains with regards to ROM but cont to lack adequate muscle strength to perform activities of PLOF  Due to these impairments, patient has difficulty performing ADL's, recreational activities, ambulation, stair negotiation, transfers  Patient's clinical presentation is consistent with their referring diagnosis of History of total right knee replacement  (primary encounter diagnosis) Right knee pain, unspecified chronicity  Patient has been educated in home exercise program and plan of care   Patient would benefit from cont skilled physical therapy services to address their aforementioned functional limitations and progress towards prior level of function and independence with home exercise program      Impairments: abnormal gait, abnormal or restricted ROM, activity intolerance, impaired physical strength, lacks appropriate home exercise program, pain with function, poor posture  and poor body mechanics    Goals  Short Term Goals to be accomplished in 4 weeks: (19)  STG1: Pt will be I with HEP (met)  STG2: Pt will demo 50% inc in knee AROM (met)  STG3: Pt will demo 1/2 MMT strength in knee  (met)  STG4: Pt will amb community distance without gait deviates due to pain (not met)    Long Term Goals to be accomplished by 4/3/19:  (ongoing)  LTG1: Pt will demo knee strength WNL to return to PLOF  LTG2: Pt will demo knee AROM WNL to minimize gait deviations  LTG3: Pt will return to household ADLs and work duties pain free as per [address] details: HEP development, stretching, strengthening, A/AA/PROM, joint mobilizations, posture education, STM/MI as needed to reduce muscle tension, muscle reeducation, PLOC discussed and agreed upon with patient  Patient would benefit from: PT eval and skilled physical therapy  Planned modality interventions: cryotherapy and thermotherapy: hydrocollator packs  Planned therapy interventions: manual therapy, neuromuscular re-education, self care, therapeutic activities, therapeutic exercise, home exercise program and patient education  Frequency: 3x week (2-3x)  Plan of Care beginning date: 1/3/2019  Plan of Care expiration date: 4/3/2019  Treatment plan discussed with: patient        Subjective Evaluation    History of Present Illness  Date of surgery: 2018  Mechanism of injury: surgery  Mechanism of injury: Pt reports she feels approx 75% improved since [facility] however cont to feel significantly below prior level of function  Pt reports she has difficulty w/ stair negotiation, walking on uneven surfaces or inclines, prolonged standing, and is unable to participate in recreational activities including dancing  Pt feels stiff after long drives and sitting statically  Pt follows up with surgeon on 19  Quality of life: good    Pain  Current pain rating: 3  At best pain ratin  At worst pain ratin  Location: medial laterla joint lines, posterior knee     Quality: dull ache, discomfort and tight  Relieving factors: change in position, rest and heat  Aggravating factors: walking, standing, sitting and stair climbing (pain worse at night, sleeping, transfers, bending/straightening knee )  Progression: improved    Social Support  Steps to enter house: yes (4 to get inside)    Employment status: working (desk work)  Hand dominance: right    Treatments  Previous treatment: injection treatment (no relief)  Current treatment: physical therapy  Patient Goals  Patient goals for therapy: decreased pain, increased motion, increased strength, independence with ADLs/IADLs and return to sport/leisure activities  Patient goal: Dancing, return to PLOF  Objective    A/PROM   R   L  Knee flex sup   114/117*  130  Knee ext   5/3*   0    Strength   R   L  Knee flex   4-/5*   5/5  Knee ext   3+/5*   5/5  Hip flex    4-/5   5/5  Hip Abd   4-/5   5/5  Hip ext    4/5   5/5  Hip add   4/5   5/5    Balance:   Tandem R posterior 18"  Tandem L posterior 38"  SLS R 2"  SLS L 5"    Function:   Stairs: Reciprocal w/ handrail for ascend, non-reciprocal descend (poor eccentric quad control)  Squatting: unable  Ambulation: Antalgic, dec TKE/heel strike w/ IC, dec knee flex during swing thru  Pt is working, desk duties  Observation: Well healed incision, mild-mod quad atrophy     Palpation/Joint mobility: (+) TTP posterior knee, hamstring tendon insertion  Mild hypmobility patellar mobility noted all directions  Precautions: Standard  Allergy to celebrex       Daily Treatment Diary     Visit #10    TherEx: 30'  Bike 5'  Standing gastroc str 30"x3  Upright leg press 30# 2x10  Stand hip abd/ext YTB 2x10 each  FSU 6" 2x10  Wobble board A/P   LAQ 2x10/3" 2#  Self assist flex stretch seated off table 10"x10  SLR flex w/ Qset 2x10 2#  Bridges 2x10    MH 10' to end  Skin intact pre/post

## 2019-02-04 ENCOUNTER — OFFICE VISIT (OUTPATIENT)
Dept: PHYSICAL THERAPY | Facility: CLINIC | Age: 79
End: 2019-02-04
Payer: MEDICARE

## 2019-02-04 DIAGNOSIS — Z96.651 HISTORY OF TOTAL RIGHT KNEE REPLACEMENT: Primary | ICD-10-CM

## 2019-02-04 DIAGNOSIS — M25.561 RIGHT KNEE PAIN, UNSPECIFIED CHRONICITY: ICD-10-CM

## 2019-02-04 PROCEDURE — 97110 THERAPEUTIC EXERCISES: CPT

## 2019-02-04 PROCEDURE — 97530 THERAPEUTIC ACTIVITIES: CPT

## 2019-02-04 NOTE — PROGRESS NOTES
Daily Note     Today's date: 2019  Patient name: Sohail Rosado  : 1940  MRN: [id number]  Referring provider: Crow Lin MD  Dx:   Encounter Diagnosis     ICD-10-CM    1  History of total right knee replacement Z96 651    2  Right knee pain, unspecified chronicity M25 561               Subjective:  Pt reports she was able [place] walk [place] the [place] and [place] 4-5 flights of stairs over the weekend  "I felt sore after, but no pain during "     Objective: See treatment diary below      Daily Treatment Diary     Visit #11    TherEx: 30'  Bike 5'   Standing gastroc str 30"x3  Stand hip abd/ext YTB 2x10 each  Upright leg press 30# 2x10  Stand TKE GTB 2x10  SLR flex w/ Qset 2x10 2#  Bridges w/ add 2x10    TherAct: 15'  Wobble board A/P 20x  FSU 6" 2x10  Lateral step up 6" 2x10 ea  BSU 4" 2x10    MH 10' to end  Skin intact pre/post       Assessment: Tolerated treatment well  Demo fair-poor quad control w/ backwards step up due to eccentric weakness  Patient demonstrated fatigue post treatment, exhibited good technique with therapeutic exercises and would benefit from continued PT      Plan: Continue per plan of care

## 2019-02-06 ENCOUNTER — OFFICE VISIT (OUTPATIENT)
Dept: PHYSICAL THERAPY | Facility: CLINIC | Age: 79
End: 2019-02-06
Payer: MEDICARE

## 2019-02-06 DIAGNOSIS — Z96.651 HISTORY OF TOTAL RIGHT KNEE REPLACEMENT: Primary | ICD-10-CM

## 2019-02-06 DIAGNOSIS — M25.561 RIGHT KNEE PAIN, UNSPECIFIED CHRONICITY: ICD-10-CM

## 2019-02-06 PROCEDURE — 97110 THERAPEUTIC EXERCISES: CPT

## 2019-02-06 PROCEDURE — 97530 THERAPEUTIC ACTIVITIES: CPT

## 2019-02-08 ENCOUNTER — OFFICE VISIT (OUTPATIENT)
Dept: PHYSICAL THERAPY | Facility: CLINIC | Age: 79
End: 2019-02-08
Payer: MEDICARE

## 2019-02-08 DIAGNOSIS — M25.561 RIGHT KNEE PAIN, UNSPECIFIED CHRONICITY: ICD-10-CM

## 2019-02-08 DIAGNOSIS — Z96.651 HISTORY OF TOTAL RIGHT KNEE REPLACEMENT: Primary | ICD-10-CM

## 2019-02-08 PROCEDURE — 97110 THERAPEUTIC EXERCISES: CPT

## 2019-02-08 PROCEDURE — 97530 THERAPEUTIC ACTIVITIES: CPT

## 2019-02-08 NOTE — PROGRESS NOTES
Daily Note     Today's date: 2019  Patient name: Ethel Enriquez  : 1940  MRN: [id number]  Referring provider: Karen Shearer MD  Dx:   Encounter Diagnosis     ICD-10-CM    1  History of total right knee replacement Z96 651    2  Right knee pain, unspecified chronicity M25 561               Subjective:  Pt reports  Stiffness today but feeling good overall  Able to do the shalini-shalini at a dance the other day  Objective: See treatment diary below      Daily Treatment Diary     Visit #13    TherEx: 15'  Bike 7 L1  Hamstring stretch w/ SOS 30"x3  Clamshells GTB 2x10  SLR flex/abd w/ Qset 2x10 2#  Stand hip abd/ext YTB 2x10 each  Upright leg press 30# 2x15  Bridges w/ add 2x10    TherAct: 15'   Wobble board A/P 20x  FSU 6" 2x10  Lateral step up 6" 2x10 ea  BSU 4" 2x5    MH 10' to end  Skin intact pre/post       Assessment: Tolerated treatment well  Patient demonstrated fatigue post treatment, exhibited good technique with therapeutic exercises and would benefit from continued PT      Plan: Continue per plan of care

## 2019-02-11 ENCOUNTER — OFFICE VISIT (OUTPATIENT)
Dept: PHYSICAL THERAPY | Facility: CLINIC | Age: 79
End: 2019-02-11
Payer: MEDICARE

## 2019-02-11 DIAGNOSIS — Z96.651 HISTORY OF TOTAL RIGHT KNEE REPLACEMENT: Primary | ICD-10-CM

## 2019-02-11 DIAGNOSIS — M25.561 RIGHT KNEE PAIN, UNSPECIFIED CHRONICITY: ICD-10-CM

## 2019-02-11 PROCEDURE — 97530 THERAPEUTIC ACTIVITIES: CPT

## 2019-02-11 PROCEDURE — 97110 THERAPEUTIC EXERCISES: CPT

## 2019-02-11 NOTE — PROGRESS NOTES
Daily Note     Today's date: 2019  Patient name: Traci Rahman  : 1940  MRN: [id number]  Referring provider: Allie Ray MD  Dx:   Encounter Diagnosis     ICD-10-CM    1  History of total right knee replacement Z96 651    2  Right knee pain, unspecified chronicity M25 561               Subjective:  Pt reports a little pain in the morning daily  "I walked a lot yesterday and it didn't feel too bad "     Objective: See treatment diary below      Daily Treatment Diary     Visit #14    TherEx: 30'  Bike 7' L1  Hamstring stretch w/ SOS 30"x3  Bridges w/ pball uni 2x10ea  SLR flex/abd w/ Qset 2x10 2#  Clamshells GTB 2x10  Upright leg press 30# 2x15  Stand hip abd/ext YTB 2x10 each    TherAct: 15'   Wobble board A/P 20x  FSU 6" 2x10  Lateral step up 6" 2x10 ea  BSU 4" 2x5      Assessment: Tolerated treatment well  Cont lateral hip muscle weakness noted  Patient demonstrated fatigue post treatment, exhibited good technique with therapeutic exercises and would benefit from continued PT      Plan: Continue per plan of care

## 2019-02-13 ENCOUNTER — APPOINTMENT (OUTPATIENT)
Dept: PHYSICAL THERAPY | Facility: CLINIC | Age: 79
End: 2019-02-13
Payer: MEDICARE

## 2019-02-15 ENCOUNTER — OFFICE VISIT (OUTPATIENT)
Dept: PHYSICAL THERAPY | Facility: CLINIC | Age: 79
End: 2019-02-15
Payer: MEDICARE

## 2019-02-15 DIAGNOSIS — M25.561 RIGHT KNEE PAIN, UNSPECIFIED CHRONICITY: ICD-10-CM

## 2019-02-15 DIAGNOSIS — Z96.651 HISTORY OF TOTAL RIGHT KNEE REPLACEMENT: Primary | ICD-10-CM

## 2019-02-15 PROCEDURE — 97110 THERAPEUTIC EXERCISES: CPT

## 2019-02-15 NOTE — PROGRESS NOTES
Daily Note     Today's date: 2/15/2019  Patient name: Tyler Costello  : 1940  MRN: [id number]  Referring provider: Eden Crystal MD  Dx:   Encounter Diagnosis     ICD-10-CM    1  History of total right knee replacement Z96 651    2  Right knee pain, unspecified chronicity M25 561               Subjective:  Pt reports her follow up with surgeon went well  No pain currently just stiffness  "I feel like I'm limping "     Objective: See treatment diary below      Daily Treatment Diary     Visit #15    TherEx: 30'  Bike 7' L1  Hamstring stretch w/ SOS 30"x3  Bridges w/ pball uni 2x10ea  SLR flex/abd w/ Qset 2x10 2#  Clamshells GTB 2x10  Upright leg press 30# 2x15  Reclined leg press 30# 2x10  Stand hip abd/ext YTB 2x10 each    TherAct: Wobble board A/P 20x  FSU 6" 2x10  Lateral step up 6" 2x10 ea  BSU 4" 2x5      Assessment: Tolerated treatment well  Pt demo good ROM and is more limited by weakness at this time  Patient demonstrated fatigue post treatment, exhibited good technique with therapeutic exercises and would benefit from continued PT      Plan: Continue per plan of care

## 2019-02-18 ENCOUNTER — OFFICE VISIT (OUTPATIENT)
Dept: PHYSICAL THERAPY | Facility: CLINIC | Age: 79
End: 2019-02-18
Payer: MEDICARE

## 2019-02-18 DIAGNOSIS — Z96.651 HISTORY OF TOTAL RIGHT KNEE REPLACEMENT: Primary | ICD-10-CM

## 2019-02-18 DIAGNOSIS — M25.561 RIGHT KNEE PAIN, UNSPECIFIED CHRONICITY: ICD-10-CM

## 2019-02-18 PROCEDURE — 97110 THERAPEUTIC EXERCISES: CPT

## 2019-02-18 PROCEDURE — 97530 THERAPEUTIC ACTIVITIES: CPT

## 2019-02-18 NOTE — PROGRESS NOTES
Daily Note     Today's date: 2019  Patient name: Tena Rose  : 1940  MRN: [id number]  Referring provider: Jaz Tavarez MD  Dx:   Encounter Diagnosis     ICD-10-CM    1  History of total right knee replacement Z96 651    2  Right knee pain, unspecified chronicity M25 561               Subjective:  Pt reports she walked a lot over the weekend     Objective: See treatment diary below      Daily Treatment Diary     Visit #16    TherEx: 27'  Bike 7' L1  Bridges w/ pball uni 2x10ea  Bridges w/ add 2x10  SLR flex/abd w/ Qset 2x10 2#  Clamshells GTB 3x10  Stand TKE GTB 2x10/3"  Upright leg press 40# 2x15  Reclined leg press 30# 2x15    TherAct: 15'  FSU 8" 2x10  Lateral step up 6" 2x10 ea  BSU 4" 3x5    MH 10' to end  Skin intact pre/post      Assessment: Tolerated treatment well  Cont quad weakness noted leading to extensor lag with SLR  Patient demonstrated fatigue post treatment, exhibited good technique with therapeutic exercises and would benefit from continued PT  Plan: Continue per plan of care

## 2019-02-20 ENCOUNTER — APPOINTMENT (OUTPATIENT)
Dept: PHYSICAL THERAPY | Facility: CLINIC | Age: 79
End: 2019-02-20
Payer: MEDICARE

## 2019-02-22 ENCOUNTER — OFFICE VISIT (OUTPATIENT)
Dept: PHYSICAL THERAPY | Facility: CLINIC | Age: 79
End: 2019-02-22
Payer: MEDICARE

## 2019-02-22 DIAGNOSIS — M25.561 RIGHT KNEE PAIN, UNSPECIFIED CHRONICITY: ICD-10-CM

## 2019-02-22 DIAGNOSIS — Z96.651 HISTORY OF TOTAL RIGHT KNEE REPLACEMENT: Primary | ICD-10-CM

## 2019-02-22 PROCEDURE — 97110 THERAPEUTIC EXERCISES: CPT

## 2019-02-22 PROCEDURE — 97530 THERAPEUTIC ACTIVITIES: CPT

## 2019-02-22 NOTE — PROGRESS NOTES
Daily Note     Today's date: 2019  Patient name: Marce Bonilla  : 1940  MRN: [id number]  Referring provider: Jerry Dennison MD  Dx:   Encounter Diagnosis     ICD-10-CM    1  History of total right knee replacement Z96 651    2  Right knee pain, unspecified chronicity M25 561          Last re-eval: 19     Subjective:  Pt reports no c/o    Objective: See treatment diary below      Daily Treatment Diary     Visit #18    TherEx: 13'  Bike 7' L1  Bridges w/ abd 2x10 blue  SLR flex/abd w/ Qset 2x10 2#  Clamshells GTB 3x10  Upright leg press 40# 2x15  Reclined leg press 30# 2x15  SL Bridges 2 x 10  LE lowering 2 x 10    TherAct: 10'  FSU 8" 2x10  Lateral step up 6" 2x10 ea  BSU 4" 3x5  Sit to stand 2 x 10    Assessment: Tolerated treatment well  Patient demonstrated fatigue post treatment, exhibited good technique with therapeutic exercises and would benefit from continued PT  Plan: Continue per plan of care

## 2019-02-25 ENCOUNTER — OFFICE VISIT (OUTPATIENT)
Dept: PHYSICAL THERAPY | Facility: CLINIC | Age: 79
End: 2019-02-25
Payer: MEDICARE

## 2019-02-25 DIAGNOSIS — Z96.651 HISTORY OF TOTAL RIGHT KNEE REPLACEMENT: ICD-10-CM

## 2019-02-25 PROCEDURE — 97530 THERAPEUTIC ACTIVITIES: CPT

## 2019-02-25 PROCEDURE — 97110 THERAPEUTIC EXERCISES: CPT

## 2019-02-25 NOTE — PROGRESS NOTES
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 and was standing and walking a lot Saturday and   She stated that she was not in pain but had soreness  Objective: See treatment diary below    Visit #18    TherEx: 45'  Bike 7' L1  Bridges w/ abd 2x15 blue  SLR flex/abd w/ Qset 2x10 2#  Clamshells BTB 3x10  Upright leg press 40# 3x15  Reclined leg press 30# 3x15  SL Bridges 2 x 10  LE fall out stretch: 6 x 10" each  TA20'  FSU 8" 2x10  Lateral step up 8" 2x10 ea  Sit to stand 2 x 10    NT:   BSU 4" 3x5  LE lowering 2 x 10  Assessment: Tolerated treatment well  Progressed step ups laterally to 8" which patient completed without loss of good form  Also progressed reps of bridging which patient demonstrated correctly and maintain good hip alignment even with BTB around thighs requiring her to activate hip abductors  Patient would benefit from continued PT in order to continue to progress LE strengthening exercises to further stabilization the R knee and increase quadriceps strength  Plan: Continue per plan of care

## 2019-02-27 ENCOUNTER — APPOINTMENT (OUTPATIENT)
Dept: PHYSICAL THERAPY | Facility: CLINIC | Age: 79
End: 2019-02-27
Payer: MEDICARE

## 2019-03-01 ENCOUNTER — APPOINTMENT (OUTPATIENT)
Dept: PHYSICAL THERAPY | Facility: CLINIC | Age: 79
End: 2019-03-01
Payer: MEDICARE

## 2019-03-04 ENCOUNTER — APPOINTMENT (OUTPATIENT)
Dept: PHYSICAL THERAPY | Facility: CLINIC | Age: 79
End: 2019-03-04
Payer: MEDICARE

## 2019-03-08 ENCOUNTER — EVALUATION (OUTPATIENT)
Dept: PHYSICAL THERAPY | Facility: CLINIC | Age: 79
End: 2019-03-08
Payer: MEDICARE

## 2019-03-08 DIAGNOSIS — Z96.651 HISTORY OF TOTAL RIGHT KNEE REPLACEMENT: Primary | ICD-10-CM

## 2019-03-08 DIAGNOSIS — M25.561 RIGHT KNEE PAIN, UNSPECIFIED CHRONICITY: ICD-10-CM

## 2019-03-08 PROCEDURE — 97530 THERAPEUTIC ACTIVITIES: CPT

## 2019-03-08 PROCEDURE — 97110 THERAPEUTIC EXERCISES: CPT

## 2019-03-08 NOTE — PROGRESS NOTES
PT Re-Evaluation     Today's date: 3/8/2019  Patient name: Asmita Lipscomb  : 1940  MRN: [id number]  Referring provider: Dorann Schwab, MD  Dx:   Encounter Diagnosis     ICD-10-CM    1  History of total right knee replacement Z96 651    2  Right knee pain, unspecified chronicity M25 561               Assessment  Assessment details: Asmita Lipscomb is a 77 y o  female who has been regularly partcipating in skilled PT since time of Ie and has demo steady progress towards est LTG's  Pt cont to present with pain, decreased strength, decreased ROM, decreased joint mobility and ambulatory dysfunction  Pt has demo steady gains with regards to ROM but cont to lack adequate muscle strength to perform activities of PLOF  Due to these impairments, patient has difficulty performing ADL's, recreational activities, ambulation, stair negotiation, transfers  Patient's clinical presentation is consistent with their referring diagnosis of History of total right knee replacement  (primary encounter diagnosis) Right knee pain, unspecified chronicity  Patient has been educated in home exercise program and plan of care   Patient would benefit from cont skilled physical therapy services to address their aforementioned functional limitations and progress towards prior level of function and independence with home exercise program    3/8/19: Pt has demonstrated improved ROM and strength, would benefit from additional therapy to further improve functional mobility especially stair negotiation  Impairments: abnormal gait, abnormal or restricted ROM, activity intolerance, impaired physical strength, lacks appropriate home exercise program, pain with function, poor posture  and poor body mechanics    Goals  Short Term Goals to be accomplished in 4 weeks: (19)  STG1: Pt will be I with HEP (met)  STG2: Pt will demo 50% inc in knee AROM (met)  STG3: Pt will demo 1/2 MMT strength in knee  (met)  STG4: Pt will amb community distance without gait deviates due to pain (not met)    Long Term Goals to be accomplished by 4/3/19:  (ongoing)  LTG1: Pt will demo knee strength WNL to return to PLOF  LTG2: Pt will demo knee AROM WNL to minimize gait deviations  LTG3: Pt will return to household ADLs and work duties pain free [facility] [address] details: HEP development, stretching, strengthening, A/AA/PROM, joint mobilizations, posture education, STM/MI as needed to reduce muscle tension, muscle reeducation, PLOC discussed and agreed upon with patient  Patient would benefit from: skilled physical therapy  Planned modality interventions: cryotherapy and thermotherapy: hydrocollator packs  Planned therapy interventions: manual therapy, neuromuscular re-education, self care, therapeutic activities, therapeutic exercise, home exercise program and patient education  Frequency: 3x week (2-3x)  Plan of Care beginning date: 1/3/2019  Plan of Care expiration date: 4/3/2019  Treatment plan discussed with: patient        Subjective Evaluation    History of Present Illness  Date of surgery: 2018  Mechanism of injury: surgery  Mechanism of injury: Pt reports she is feeling good but still struggling with stairs, especially descending stairs      Quality of life: good    Pain  Current pain ratin  At best pain ratin  At worst pain ratin  Location: medial laterla joint lines, posterior knee     Quality: dull ache, discomfort and tight  Relieving factors: change in position, rest and heat  Aggravating factors: stair climbing and sitting (pain worse at night, sleeping, transfers, bending/straightening knee )  Progression: improved    Social Support  Steps to enter house: yes (4 to get inside)    Employment status: working (desk work)  Hand dominance: right    Treatments  Previous treatment: injection treatment (no relief)  Current treatment: physical therapy  Patient Goals  Patient goals for therapy: decreased pain, increased motion, increased strength, independence with ADLs/IADLs and return to sport/leisure activities  Patient goal: Dancing, return to PLOF  Objective    A/PROM   R   L  Knee flex sup   118/122*  130  Knee ext   5/0*   0    Strength   R   L  Knee flex   4-/5*   5/5  Knee ext   4-/5*   5/5  Hip flex    4-/5   5/5  Hip Abd   4-/5   5/5  Hip ext    4/5   5/5  Hip add   4/5   5/5    Function:   Stairs: Reciprocal w/ handrail for ascend, non-reciprocal descend (poor eccentric quad control)  Squatting: to chair with UE assist  Ambulation: decreased khloe and dec knee flex during swing thru  Pt is working, desk duties  Observation: Well healed incision, mild-mod quad atrophy     Precautions: Standard  Allergy to celebrex       Daily Treatment Diary     TherEx: 10'  Bike 5'  Standing gastroc str 30"x3  Upright leg press 30# 2x10    There Act 20 30 min  FSU 8" 2x10  Ecc step down 2 x 10  Sit - stand with SOS for UE assist 2 x 10

## 2019-03-11 ENCOUNTER — APPOINTMENT (OUTPATIENT)
Dept: PHYSICAL THERAPY | Facility: CLINIC | Age: 79
End: 2019-03-11
Payer: MEDICARE

## 2019-03-15 ENCOUNTER — OFFICE VISIT (OUTPATIENT)
Dept: PHYSICAL THERAPY | Facility: CLINIC | Age: 79
End: 2019-03-15
Payer: MEDICARE

## 2019-03-15 DIAGNOSIS — Z96.651 HISTORY OF TOTAL RIGHT KNEE REPLACEMENT: Primary | ICD-10-CM

## 2019-03-15 DIAGNOSIS — M25.561 RIGHT KNEE PAIN, UNSPECIFIED CHRONICITY: ICD-10-CM

## 2019-03-15 PROCEDURE — 97110 THERAPEUTIC EXERCISES: CPT

## 2019-03-15 PROCEDURE — 97112 NEUROMUSCULAR REEDUCATION: CPT

## 2019-03-15 PROCEDURE — 97530 THERAPEUTIC ACTIVITIES: CPT

## 2019-03-15 NOTE — PROGRESS NOTES
Daily Note     Today's date: 3/15/2019  Patient name: Glenn Alfaro  : 1940  MRN: [id number]  Referring provider: Kei Vazquez MD  Dx:   Encounter Diagnosis     ICD-10-CM    1  History of total right knee replacement Z96 651    2  Right knee pain, unspecified chronicity M25 561          Last re-eval: 3/8/19  POC expires4/3/19     Subjective:  Pt reports no c/o  C/C is difficulty with stairs    Objective: See treatment diary below      Daily Treatment Diary     Visit #20    TherEx: 10'  Right LE stretching to increase knee flex/ext  Patellar mobs    Neuro re ed 15 min  Bridges w/ abd 2x10 blue  Clamshells GTB 3x10  SL Bridges 2 x 10  LE lowering 2 x 10    TherAct: 15'  FSU 8" 2x10  Lateral step up 6" 2x10 ea  Ecc step down 6in 2 x 10  Sit to stand 2 x 10    Assessment: Tolerated treatment well  Patient demonstrated fatigue post treatment, exhibited good technique with therapeutic exercises and would benefit from continued PT  Plan: Continue per plan of care

## 2019-03-18 ENCOUNTER — APPOINTMENT (OUTPATIENT)
Dept: PHYSICAL THERAPY | Facility: CLINIC | Age: 79
End: 2019-03-18
Payer: MEDICARE

## 2019-03-22 ENCOUNTER — OFFICE VISIT (OUTPATIENT)
Dept: PHYSICAL THERAPY | Facility: CLINIC | Age: 79
End: 2019-03-22
Payer: MEDICARE

## 2019-03-22 DIAGNOSIS — M25.561 RIGHT KNEE PAIN, UNSPECIFIED CHRONICITY: ICD-10-CM

## 2019-03-22 DIAGNOSIS — Z96.651 HISTORY OF TOTAL RIGHT KNEE REPLACEMENT: Primary | ICD-10-CM

## 2019-03-22 PROCEDURE — 97112 NEUROMUSCULAR REEDUCATION: CPT

## 2019-03-22 PROCEDURE — 97140 MANUAL THERAPY 1/> REGIONS: CPT

## 2019-03-22 PROCEDURE — 97530 THERAPEUTIC ACTIVITIES: CPT

## 2019-03-22 NOTE — PROGRESS NOTES
Daily Note     Today's date: 3/22/2019  Patient name: Tena Rose  : 1940  MRN: [id number]  Referring provider: Jaz Tavarez MD  Dx:   Encounter Diagnosis     ICD-10-CM    1  History of total right knee replacement Z96 651    2  Right knee pain, unspecified chronicity M25 561        Start Time: 905  Stop Time: 954  Total time in clinic (min): 49 minutes    Subjective: Patient reports that she has been so busy she has not had time to do her exercises at home because she gets home too late and wants to go to bed  She states that she has poor balance but she has always had poor balance even as a child  She arrived ~ 5 min late  Objective: See treatment diary below    Visit #21    Manual : 10'  Right LE stretching to increase knee flex/ext  Patellar mobs    Neuro re ed 17 min  Bridges w/ abd 2x10 blue-NT   Bridging with PB: 2 x 10   Clamshells GTB 3x10  SL Bridges 2 x 10-NT  LE lowering 2 x 10  SLS with hip extension at anjum: 4# 2 x 12 each    TherAct: 16'  FSU 8" 2x10  Lateral step up 6" 2x10 ea  Ecc step down 6in 2 x 10  Sit to stand 2 x 12    Modalities:  CP 6' post tx to R knee: skin intact pre and post     Assessment: Tolerated treatment well  Increase in knee pain initially with bike, but moved seat back which reduced pain  No pain with eccentric control down stairs  Progressed to SLS with hip strengthening on the anjum which she completed pain free and demonstrated muscular fatigue by end of reps  Patient would benefit from continued PT to return to prior level of function so she can complete household chores and ADLs with reduced risk of falls as she lives alone  Plan: Continue per plan of care

## 2019-03-25 ENCOUNTER — OFFICE VISIT (OUTPATIENT)
Dept: PHYSICAL THERAPY | Facility: CLINIC | Age: 79
End: 2019-03-25
Payer: MEDICARE

## 2019-03-25 DIAGNOSIS — M25.561 RIGHT KNEE PAIN, UNSPECIFIED CHRONICITY: Primary | ICD-10-CM

## 2019-03-25 DIAGNOSIS — Z96.651 HISTORY OF TOTAL RIGHT KNEE REPLACEMENT: ICD-10-CM

## 2019-03-25 PROCEDURE — G8985 CARRY GOAL STATUS: HCPCS

## 2019-03-25 PROCEDURE — 97110 THERAPEUTIC EXERCISES: CPT

## 2019-03-25 PROCEDURE — G8984 CARRY CURRENT STATUS: HCPCS

## 2019-03-25 NOTE — PROGRESS NOTES
PT Re-Evaluation     Today's date: 3/25/2019  Patient name: Charley Price  : 1940  MRN: [id number]  Referring provider: Jessica Agrawal MD  Dx:   Encounter Diagnosis     ICD-10-CM    1  Right knee pain, unspecified chronicity M25 561    2  History of total right knee replacement Z96 651      Start Time: 0900  Stop Time: 09  Total time in clinic (min): 45 minutes  Assessment  Assessment details: Charley Price is a 77 y o  female who has been regularly partcipating in skilled PT since time of Ie and has demo steady progress towards est LTG's  Pt cont to present with pain, decreased strength, decreased ROM, decreased joint mobility and ambulatory dysfunction  Pt has demo steady gains with regards to ROM but cont to lack adequate muscle strength to perform activities of PLOF  Due to these impairments, patient has difficulty performing ADL's, recreational activities, ambulation, stair negotiation, transfers  Patient's clinical presentation is consistent with their referring diagnosis of History of total right knee replacement  (primary encounter diagnosis) Right knee pain, unspecified chronicity  Patient has been educated in home exercise program and plan of care  Patient would benefit from cont skilled physical therapy services to address their aforementioned functional limitations and progress towards prior level of function and independence with home exercise program    3/8/19: Pt has demonstrated improved ROM and strength, would benefit from additional therapy to further improve functional mobility especially stair negotiation    3/25/19  Since beginning PT patient reports that she has improved by 75% with pain at worst 5/10 and biggest complaint is stiffness  Knee ROM limitations still present especially with knee flexion, contributing to her gait mechanics present   Improvements in LE strength noted with MMTing, but still weakness present in the hip musculature leading to abnormal biomechanical forces at the knees  Functional strength improvements also made with squatting as she can complete with good form and requiring minimal PT verbal cues but continues to require BL UE support  Continues to have difficulty with descending stairs due to poor eccentric control of the R LE  Patient would benefit from skilled PT in order to improve knee ROM, BL hip musculature strength, eccentric control of the R LE, address gait impairments, and improve functional activities so she can ambulate at home and in the community, descend stairs, complete ADL's and houeshold chores requiring standing and ambulating for long periods of time and 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  Impairments: abnormal gait, abnormal or restricted ROM, activity intolerance, impaired physical strength, lacks appropriate home exercise program, pain with function, poor posture  and poor body mechanics    Symptom irritability: moderateUnderstanding of Dx/Px/POC: good   Prognosis: good    Goals  Short Term Goals to be accomplished in 4 weeks: (1/31/19)  STG1: Pt will be I with HEP (met)  STG2: Pt will demo 50% inc in knee AROM (met)  STG3: Pt will demo 1/2 MMT strength in knee  (met)  STG4: Pt will amb community distance without gait deviates due to pain (partially met)    Long Term Goals to be accomplished by 4/3/19:  (ongoing)  LTG1: Pt will demo knee strength WNL to return to PLOF  LTG2: Pt will demo knee AROM WNL to minimize gait deviations  LTG3: Pt will return to household ADLs and work duties pain free as per [employer]  Plan details: HEP development, stretching, strengthening, A/AA/PROM, joint mobilizations, posture education, STM/MI as needed to reduce muscle tension, muscle reeducation, PLOC discussed and agreed upon with patient        Patient would benefit from: skilled physical therapy  Planned modality interventions: cryotherapy and thermotherapy: hydrocollator packs  Planned therapy interventions: manual therapy, neuromuscular re-education, self care, therapeutic activities, therapeutic exercise, home exercise program and patient education  Frequency: 3x week (2-3x)  Plan of Care beginning date: 3/25/2019  Plan of Care expiration date: 2019  Treatment plan discussed with: patient        Subjective Evaluation    History of Present Illness  Date of surgery: 2018  Mechanism of injury: surgery  Mechanism of injury: Pt reports she is feeling good but still struggling with stairs, especially descending stairs    3/25/19  Patient reports that since beginning PT she has improved by 75% and notices her greatest improvements in her ability to walk a long duration  Her biggest complaint is stiffness  Her pain recently at worst is 5/10  She is going away on a trip to [facility] [place] and will not be back until 2nd week in April  Quality of life: good    Pain  Current pain ratin  At best pain ratin  At worst pain ratin  Location: medial laterla joint lines, posterior knee  Quality: dull ache, discomfort and tight  Relieving factors: change in position, rest and heat  Aggravating factors: stair climbing and sitting (pain worse at night, sleeping, transfers, bending/straightening knee )  Progression: improved    Social Support  Steps to enter house: yes (4 to get inside)    Employment status: working (desk work)  Hand dominance: right    Treatments  Previous treatment: injection treatment (no relief)  Current treatment: physical therapy  Patient Goals  Patient goals for therapy: decreased pain, increased motion, increased strength, independence with ADLs/IADLs and return to sport/leisure activities  Patient goal: Dancing, return to PLOF          Objective    A/PROM   R   L  Knee flex sup   115/120*  130  Knee ext   -2/0   0    Strength   R   L  Knee flex   5/5   5/5  Knee ext   5/5   5/5  Hip flex    4/5   5/5  Hip Abd   5/5   5/5  Hip ext    4/5   5/5  Hip add   4/5   5/5    Function:   Stairs: Reciprocal w/ handrail for ascend, non-reciprocal descend (poor eccentric quad control)  Squatting: to chair with UE assist  Ambulation: decreased khloe and dec knee flex during swing thru  Pt is working, desk duties  Observation: Well healed incision   Girth Measurments:  R LE:  10 cm above: 38 8 cm  @ joint line: 37 2 cm  10 cm below: 32 5 cm    LLE:  10 cm above joint line: 38 7 cm  @ joint line: 36 5 cm  10 cm below joint line: 32 5 cm   Precautions: Standard  Allergy to celebrex       Daily Treatment Diary     TherEx: 8'  Bike 8'  Standing gastroc str 30"x3-NT  Upright leg press 30# 2x10-NT    There Act 20 min  FSU 8" 2x10  Ecc step down 2 x 10  Sit - stand with SOS for UE assist 2 x 10  Free Squat: BL UE support 2 x 10 requiring minimal PT verbal cues

## 2019-03-29 ENCOUNTER — APPOINTMENT (OUTPATIENT)
Dept: PHYSICAL THERAPY | Facility: CLINIC | Age: 79
End: 2019-03-29
Payer: MEDICARE

## 2019-04-12 ENCOUNTER — OFFICE VISIT (OUTPATIENT)
Dept: PHYSICAL THERAPY | Facility: CLINIC | Age: 79
End: 2019-04-12
Payer: MEDICARE

## 2019-04-12 DIAGNOSIS — Z96.651 HISTORY OF TOTAL RIGHT KNEE REPLACEMENT: Primary | ICD-10-CM

## 2019-04-12 DIAGNOSIS — M25.561 RIGHT KNEE PAIN, UNSPECIFIED CHRONICITY: ICD-10-CM

## 2019-04-12 PROCEDURE — 97112 NEUROMUSCULAR REEDUCATION: CPT

## 2019-04-12 PROCEDURE — 97110 THERAPEUTIC EXERCISES: CPT

## 2019-04-15 ENCOUNTER — OFFICE VISIT (OUTPATIENT)
Dept: PHYSICAL THERAPY | Facility: CLINIC | Age: 79
End: 2019-04-15
Payer: MEDICARE

## 2019-04-15 DIAGNOSIS — M25.561 RIGHT KNEE PAIN, UNSPECIFIED CHRONICITY: ICD-10-CM

## 2019-04-15 DIAGNOSIS — Z96.651 HISTORY OF TOTAL RIGHT KNEE REPLACEMENT: Primary | ICD-10-CM

## 2019-04-15 PROCEDURE — 97140 MANUAL THERAPY 1/> REGIONS: CPT

## 2019-04-15 PROCEDURE — 97110 THERAPEUTIC EXERCISES: CPT

## 2019-04-19 ENCOUNTER — APPOINTMENT (OUTPATIENT)
Dept: PHYSICAL THERAPY | Facility: CLINIC | Age: 79
End: 2019-04-19
Payer: MEDICARE

## 2019-04-22 ENCOUNTER — OFFICE VISIT (OUTPATIENT)
Dept: PHYSICAL THERAPY | Facility: CLINIC | Age: 79
End: 2019-04-22
Payer: MEDICARE

## 2019-04-22 DIAGNOSIS — M25.561 RIGHT KNEE PAIN, UNSPECIFIED CHRONICITY: Primary | ICD-10-CM

## 2019-04-22 DIAGNOSIS — Z96.651 HISTORY OF TOTAL RIGHT KNEE REPLACEMENT: ICD-10-CM

## 2019-04-22 PROCEDURE — G8985 CARRY GOAL STATUS: HCPCS

## 2019-04-22 PROCEDURE — 97110 THERAPEUTIC EXERCISES: CPT

## 2019-04-22 PROCEDURE — G8984 CARRY CURRENT STATUS: HCPCS

## 2019-04-26 ENCOUNTER — OFFICE VISIT (OUTPATIENT)
Dept: PHYSICAL THERAPY | Facility: CLINIC | Age: 79
End: 2019-04-26
Payer: MEDICARE

## 2019-04-26 DIAGNOSIS — M25.561 RIGHT KNEE PAIN, UNSPECIFIED CHRONICITY: Primary | ICD-10-CM

## 2019-04-26 DIAGNOSIS — Z96.651 HISTORY OF TOTAL RIGHT KNEE REPLACEMENT: ICD-10-CM

## 2019-04-26 PROCEDURE — 97110 THERAPEUTIC EXERCISES: CPT

## 2019-04-29 ENCOUNTER — APPOINTMENT (OUTPATIENT)
Dept: RADIOLOGY | Facility: CLINIC | Age: 79
End: 2019-04-29
Payer: MEDICARE

## 2019-04-29 ENCOUNTER — TRANSCRIBE ORDERS (OUTPATIENT)
Dept: ADMINISTRATIVE | Facility: HOSPITAL | Age: 79
End: 2019-04-29

## 2019-04-29 ENCOUNTER — OFFICE VISIT (OUTPATIENT)
Dept: PHYSICAL THERAPY | Facility: CLINIC | Age: 79
End: 2019-04-29
Payer: MEDICARE

## 2019-04-29 DIAGNOSIS — M25.561 RIGHT KNEE PAIN, UNSPECIFIED CHRONICITY: Primary | ICD-10-CM

## 2019-04-29 DIAGNOSIS — Z96.651 HISTORY OF TOTAL RIGHT KNEE REPLACEMENT: ICD-10-CM

## 2019-04-29 DIAGNOSIS — R05.9 COUGH: Primary | ICD-10-CM

## 2019-04-29 DIAGNOSIS — R05.9 COUGH: ICD-10-CM

## 2019-04-29 PROCEDURE — 97112 NEUROMUSCULAR REEDUCATION: CPT

## 2019-04-29 PROCEDURE — 97110 THERAPEUTIC EXERCISES: CPT

## 2019-04-29 PROCEDURE — 71046 X-RAY EXAM CHEST 2 VIEWS: CPT

## 2019-05-03 ENCOUNTER — APPOINTMENT (OUTPATIENT)
Dept: PHYSICAL THERAPY | Facility: CLINIC | Age: 79
End: 2019-05-03
Payer: MEDICARE

## 2019-05-03 ENCOUNTER — OFFICE VISIT (OUTPATIENT)
Dept: PHYSICAL THERAPY | Facility: CLINIC | Age: 79
End: 2019-05-03
Payer: MEDICARE

## 2019-05-03 DIAGNOSIS — Z96.651 HISTORY OF TOTAL RIGHT KNEE REPLACEMENT: ICD-10-CM

## 2019-05-03 DIAGNOSIS — M25.561 RIGHT KNEE PAIN, UNSPECIFIED CHRONICITY: Primary | ICD-10-CM

## 2019-05-03 PROCEDURE — 97110 THERAPEUTIC EXERCISES: CPT

## 2020-10-02 ENCOUNTER — APPOINTMENT (OUTPATIENT)
Dept: RADIOLOGY | Facility: CLINIC | Age: 80
End: 2020-10-02
Payer: MEDICARE

## 2020-10-02 ENCOUNTER — TRANSCRIBE ORDERS (OUTPATIENT)
Dept: URGENT CARE | Facility: CLINIC | Age: 80
End: 2020-10-02

## 2020-10-02 DIAGNOSIS — M79.671 BILATERAL FOOT PAIN: Primary | ICD-10-CM

## 2020-10-02 DIAGNOSIS — M79.672 BILATERAL FOOT PAIN: ICD-10-CM

## 2020-10-02 DIAGNOSIS — M79.671 BILATERAL FOOT PAIN: ICD-10-CM

## 2020-10-02 DIAGNOSIS — M79.672 BILATERAL FOOT PAIN: Primary | ICD-10-CM

## 2020-10-02 PROCEDURE — 73630 X-RAY EXAM OF FOOT: CPT

## 2021-01-28 ENCOUNTER — IMMUNIZATIONS (OUTPATIENT)
Dept: FAMILY MEDICINE CLINIC | Facility: HOSPITAL | Age: 81
End: 2021-01-28

## 2021-01-28 DIAGNOSIS — Z23 ENCOUNTER FOR IMMUNIZATION: Primary | ICD-10-CM

## 2021-01-28 PROCEDURE — 0011A SARS-COV-2 / COVID-19 MRNA VACCINE (MODERNA) 100 MCG: CPT

## 2021-01-28 PROCEDURE — 91301 SARS-COV-2 / COVID-19 MRNA VACCINE (MODERNA) 100 MCG: CPT

## 2021-02-25 ENCOUNTER — IMMUNIZATIONS (OUTPATIENT)
Dept: FAMILY MEDICINE CLINIC | Facility: HOSPITAL | Age: 81
End: 2021-02-25

## 2021-02-25 DIAGNOSIS — Z23 ENCOUNTER FOR IMMUNIZATION: Primary | ICD-10-CM

## 2021-02-25 PROCEDURE — 91301 SARS-COV-2 / COVID-19 MRNA VACCINE (MODERNA) 100 MCG: CPT

## 2021-02-25 PROCEDURE — 0012A SARS-COV-2 / COVID-19 MRNA VACCINE (MODERNA) 100 MCG: CPT

## 2021-12-09 ENCOUNTER — HOSPITAL ENCOUNTER (OUTPATIENT)
Dept: RADIOLOGY | Facility: HOSPITAL | Age: 81
Discharge: HOME | End: 2021-12-09
Payer: MEDICARE

## 2021-12-09 DIAGNOSIS — Z78.0 POST-MENOPAUSE: ICD-10-CM

## 2021-12-09 PROCEDURE — 77080 DXA BONE DENSITY AXIAL: CPT

## 2022-04-26 ENCOUNTER — APPOINTMENT (OUTPATIENT)
Dept: RADIOLOGY | Facility: CLINIC | Age: 82
End: 2022-04-26
Payer: MEDICARE

## 2022-04-26 DIAGNOSIS — R05.3 CHRONIC COUGH: ICD-10-CM

## 2022-04-26 PROCEDURE — 71046 X-RAY EXAM CHEST 2 VIEWS: CPT

## 2022-07-31 ENCOUNTER — HOSPITAL ENCOUNTER (EMERGENCY)
Facility: HOSPITAL | Age: 82
Discharge: HOME | End: 2022-07-31
Attending: EMERGENCY MEDICINE | Admitting: EMERGENCY MEDICINE
Payer: MEDICARE

## 2022-07-31 ENCOUNTER — TELEPHONE (OUTPATIENT)
Dept: FAMILY MEDICINE CLINIC | Facility: CLINIC | Age: 82
End: 2022-07-31

## 2022-07-31 VITALS
WEIGHT: 146.8 LBS | OXYGEN SATURATION: 94 % | TEMPERATURE: 99 F | RESPIRATION RATE: 18 BRPM | SYSTOLIC BLOOD PRESSURE: 145 MMHG | HEART RATE: 88 BPM | DIASTOLIC BLOOD PRESSURE: 75 MMHG | BODY MASS INDEX: 26 KG/M2

## 2022-07-31 DIAGNOSIS — U07.1 COVID-19: ICD-10-CM

## 2022-07-31 DIAGNOSIS — R63.0 DECREASED APPETITE: Primary | ICD-10-CM

## 2022-07-31 LAB
ALBUMIN SERPL BCP-MCNC: 3.6 G/DL (ref 3.5–5)
ALP SERPL-CCNC: 130 U/L (ref 46–116)
ALT SERPL W P-5'-P-CCNC: 30 U/L (ref 12–78)
ANION GAP SERPL CALCULATED.3IONS-SCNC: 9 MMOL/L (ref 4–13)
AST SERPL W P-5'-P-CCNC: 30 U/L (ref 5–45)
BASOPHILS # BLD AUTO: 0.04 THOUSANDS/ΜL (ref 0–0.1)
BASOPHILS NFR BLD AUTO: 1 % (ref 0–1)
BILIRUB SERPL-MCNC: 0.46 MG/DL (ref 0.2–1)
BUN SERPL-MCNC: 5 MG/DL (ref 5–25)
CALCIUM SERPL-MCNC: 9 MG/DL (ref 8.3–10.1)
CHLORIDE SERPL-SCNC: 97 MMOL/L (ref 96–108)
CO2 SERPL-SCNC: 31 MMOL/L (ref 21–32)
CREAT SERPL-MCNC: 1.07 MG/DL (ref 0.6–1.3)
EOSINOPHIL # BLD AUTO: 0.01 THOUSAND/ΜL (ref 0–0.61)
EOSINOPHIL NFR BLD AUTO: 0 % (ref 0–6)
ERYTHROCYTE [DISTWIDTH] IN BLOOD BY AUTOMATED COUNT: 12.8 % (ref 11.6–15.1)
GFR SERPL CREATININE-BSD FRML MDRD: 48 ML/MIN/1.73SQ M
GLUCOSE SERPL-MCNC: 120 MG/DL (ref 65–140)
HCT VFR BLD AUTO: 42.5 % (ref 34.8–46.1)
HGB BLD-MCNC: 13.8 G/DL (ref 11.5–15.4)
IMM GRANULOCYTES # BLD AUTO: 0.02 THOUSAND/UL (ref 0–0.2)
IMM GRANULOCYTES NFR BLD AUTO: 0 % (ref 0–2)
LYMPHOCYTES # BLD AUTO: 0.88 THOUSANDS/ΜL (ref 0.6–4.47)
LYMPHOCYTES NFR BLD AUTO: 15 % (ref 14–44)
MCH RBC QN AUTO: 31.9 PG (ref 26.8–34.3)
MCHC RBC AUTO-ENTMCNC: 32.5 G/DL (ref 31.4–37.4)
MCV RBC AUTO: 98 FL (ref 82–98)
MONOCYTES # BLD AUTO: 0.84 THOUSAND/ΜL (ref 0.17–1.22)
MONOCYTES NFR BLD AUTO: 14 % (ref 4–12)
NEUTROPHILS # BLD AUTO: 4.21 THOUSANDS/ΜL (ref 1.85–7.62)
NEUTS SEG NFR BLD AUTO: 70 % (ref 43–75)
NRBC BLD AUTO-RTO: 0 /100 WBCS
PLATELET # BLD AUTO: 276 THOUSANDS/UL (ref 149–390)
PMV BLD AUTO: 9.7 FL (ref 8.9–12.7)
POTASSIUM SERPL-SCNC: 3.9 MMOL/L (ref 3.5–5.3)
PROT SERPL-MCNC: 7.4 G/DL (ref 6.4–8.4)
RBC # BLD AUTO: 4.32 MILLION/UL (ref 3.81–5.12)
SARS-COV-2 RNA RESP QL NAA+PROBE: POSITIVE
SODIUM SERPL-SCNC: 137 MMOL/L (ref 135–147)
WBC # BLD AUTO: 6 THOUSAND/UL (ref 4.31–10.16)

## 2022-07-31 PROCEDURE — 80053 COMPREHEN METABOLIC PANEL: CPT | Performed by: EMERGENCY MEDICINE

## 2022-07-31 PROCEDURE — U0003 INFECTIOUS AGENT DETECTION BY NUCLEIC ACID (DNA OR RNA); SEVERE ACUTE RESPIRATORY SYNDROME CORONAVIRUS 2 (SARS-COV-2) (CORONAVIRUS DISEASE [COVID-19]), AMPLIFIED PROBE TECHNIQUE, MAKING USE OF HIGH THROUGHPUT TECHNOLOGIES AS DESCRIBED BY CMS-2020-01-R: HCPCS | Performed by: EMERGENCY MEDICINE

## 2022-07-31 PROCEDURE — 99284 EMERGENCY DEPT VISIT MOD MDM: CPT | Performed by: EMERGENCY MEDICINE

## 2022-07-31 PROCEDURE — 36415 COLL VENOUS BLD VENIPUNCTURE: CPT | Performed by: EMERGENCY MEDICINE

## 2022-07-31 PROCEDURE — U0005 INFEC AGEN DETEC AMPLI PROBE: HCPCS | Performed by: EMERGENCY MEDICINE

## 2022-07-31 PROCEDURE — 85025 COMPLETE CBC W/AUTO DIFF WBC: CPT | Performed by: EMERGENCY MEDICINE

## 2022-07-31 PROCEDURE — 99283 EMERGENCY DEPT VISIT LOW MDM: CPT

## 2022-07-31 RX ORDER — ACETAMINOPHEN 325 MG/1
650 TABLET ORAL ONCE
Status: COMPLETED | OUTPATIENT
Start: 2022-07-31 | End: 2022-07-31

## 2022-07-31 RX ORDER — BENZONATATE 100 MG/1
100 CAPSULE ORAL ONCE
Status: COMPLETED | OUTPATIENT
Start: 2022-07-31 | End: 2022-07-31

## 2022-07-31 RX ORDER — BENZONATATE 100 MG/1
100 CAPSULE ORAL EVERY 8 HOURS
Qty: 21 CAPSULE | Refills: 0 | Status: SHIPPED | OUTPATIENT
Start: 2022-07-31

## 2022-07-31 RX ORDER — ONDANSETRON 4 MG/1
4 TABLET, FILM COATED ORAL EVERY 6 HOURS
Qty: 12 TABLET | Refills: 0 | Status: SHIPPED | OUTPATIENT
Start: 2022-07-31

## 2022-07-31 RX ADMIN — ACETAMINOPHEN 650 MG: 325 TABLET, FILM COATED ORAL at 15:41

## 2022-07-31 RX ADMIN — BENZONATATE 100 MG: 100 CAPSULE ORAL at 15:41

## 2022-07-31 NOTE — TELEPHONE ENCOUNTER
Spoke with both patient and her daughter Burke Aldana  Patient denies any SOB at this time  I have instructed both that if she would happen to develop SOB she should be taken to the ER  Otherwise she should follow-up with her PCP tomorrow morning for a COVID f/u appointment  I did give patients daughter my phone number if she had any further questions

## 2022-07-31 NOTE — DISCHARGE INSTRUCTIONS
Use the prescribed Zofran for nausea, Tessalon Perles, Tylenol for headache and body ache    Continue to monitor oxygen saturations at home, return to the emergency department if they are dropping below 90% or if you are having significant shortness of breath

## 2022-07-31 NOTE — TELEPHONE ENCOUNTER
----- Message from Arelis Singh MD sent at 7/31/2022  4:26 PM EDT -----  Regarding: MAB requested by patient daughter  Covid positive symptomatic since 7/29

## 2022-07-31 NOTE — ED PROVIDER NOTES
History  Chief Complaint   Patient presents with    Biological Exposure     Daughter states patient tested positive for Covid and was told by her PCP that oral agent is not as effective as antibody infusion and she should go to ED for infusion  HPI  Patient is an 69-year-old female presenting for evaluation of cough, myalgia, decreased appetite, general malaise following COVID exposure and positive home testing  Patient states that she was referred by her primary care provider to come into the emergency department for antibody infusion however these services not offered in this emergency department  Patient denies nausea, vomiting, diarrhea, constipation, decreased urination  Patient states that she has continued to drink a lot of fluids, denies significant shortness of breath  Prior to Admission Medications   Prescriptions Last Dose Informant Patient Reported? Taking? amphetamine-dextroamphetamine (ADDERALL) 5 MG tablet   Yes No   Sig:     temazepam (RESTORIL) 30 mg capsule   Yes No   Sig: TAKE 1 CAPSULE (30 MG TOTAL) BY MOUTH NIGHTLY AS NEEDED FOR SLEEP      Facility-Administered Medications: None       Past Medical History:   Diagnosis Date    HL (hearing loss)     Migraine     Sleep difficulties     Tinnitus        Past Surgical History:   Procedure Laterality Date    JOINT REPLACEMENT      TONSILLECTOMY         History reviewed  No pertinent family history  I have reviewed and agree with the history as documented  E-Cigarette/Vaping    E-Cigarette Use Never User      E-Cigarette/Vaping Substances     Social History     Tobacco Use    Smoking status: Former Smoker    Smokeless tobacco: Never Used   Vaping Use    Vaping Use: Never used   Substance Use Topics    Alcohol use: Not Currently    Drug use: Never       Review of Systems   Constitutional: Positive for appetite change  Negative for chills, fatigue and fever  HENT: Negative for sore throat      Eyes: Negative for visual disturbance  Respiratory: Negative for cough, chest tightness and shortness of breath  Cardiovascular: Negative for chest pain  Gastrointestinal: Negative for abdominal distention, abdominal pain, constipation, diarrhea, nausea and vomiting  Endocrine: Negative for polydipsia and polyuria  Genitourinary: Negative for dysuria and hematuria  Musculoskeletal: Positive for myalgias  Negative for arthralgias  Skin: Negative for color change and rash  Neurological: Negative for dizziness and headaches  Psychiatric/Behavioral: Negative for confusion  All other systems reviewed and are negative  Physical Exam  Physical Exam  Vitals reviewed  Constitutional:       General: She is not in acute distress  Appearance: She is well-developed  She is not diaphoretic  Comments: Well-appearing, nondistressed   HENT:      Head: Normocephalic and atraumatic  Comments: Moist mucous membranes     Right Ear: External ear normal       Left Ear: External ear normal       Nose: Nose normal       Mouth/Throat:      Pharynx: No oropharyngeal exudate  Eyes:      Pupils: Pupils are equal, round, and reactive to light  Cardiovascular:      Rate and Rhythm: Normal rate and regular rhythm  Heart sounds: Normal heart sounds  No murmur heard  No friction rub  No gallop  Comments: Regular rate and rhythm, no murmurs rubs or gallops  Extremities warm and well perfused  Pulmonary:      Effort: Pulmonary effort is normal  No respiratory distress  Breath sounds: Normal breath sounds  No wheezing  Chest:      Chest wall: No tenderness  Abdominal:      General: Bowel sounds are normal  There is no distension  Palpations: Abdomen is soft  There is no mass  Tenderness: There is no abdominal tenderness  There is no guarding  Musculoskeletal:         General: No deformity  Normal range of motion  Cervical back: Normal range of motion     Lymphadenopathy:      Cervical: No cervical adenopathy  Skin:     General: Skin is warm and dry  Capillary Refill: Capillary refill takes less than 2 seconds  Neurological:      Mental Status: She is alert and oriented to person, place, and time  Vital Signs  ED Triage Vitals [07/31/22 1418]   Temperature Pulse Respirations Blood Pressure SpO2   100 3 °F (37 9 °C) 92 20 140/84 95 %      Temp Source Heart Rate Source Patient Position - Orthostatic VS BP Location FiO2 (%)   Tympanic Monitor Sitting Left arm --      Pain Score       --           Vitals:    07/31/22 1418   BP: 140/84   Pulse: 92   Patient Position - Orthostatic VS: Sitting         Visual Acuity      ED Medications  Medications   benzonatate (TESSALON PERLES) capsule 100 mg (100 mg Oral Given 7/31/22 1541)   acetaminophen (TYLENOL) tablet 650 mg (650 mg Oral Given 7/31/22 1541)       Diagnostic Studies  Results Reviewed     Procedure Component Value Units Date/Time    COVID only [[id number]]  (Abnormal) Collected: 07/31/22 1537    Lab Status: Final result Specimen: Nares from Nasopharyngeal Swab Updated: 07/31/22 1621     SARS-CoV-2 Positive    Narrative:      FOR PEDIATRIC PATIENTS - copy/paste COVID Guidelines URL to browser: [email or web address]  [email or web address] (by [email or web address]    SARS-CoV-2 assay is a Nucleic Acid Amplification assay intended for the  qualitative detection of nucleic acid from SARS-CoV-2 in nasopharyngeal  swabs  Results are for the presumptive identification of SARS-CoV-2 RNA  Positive results are indicative of infection with SARS-CoV-2, the virus  causing COVID-19, but do not rule out bacterial infection or co-infection  with other viruses  Laboratories within the [place] and its  territories are required to report all positive results to the appropriate  public health authorities   Negative results do not preclude SARS-CoV-2  infection and should not be used as the sole basis for treatment or other  patient management decisions  Negative results must be combined with  clinical observations, patient history, and epidemiological information  This test has not been FDA cleared or approved  This test has been authorized by FDA under an Emergency Use Authorization  (EUA)  This test is only authorized for the duration of time the  declaration that circumstances exist justifying the authorization of the  emergency use of an in vitro diagnostic tests for detection of SARS-CoV-2  virus and/or diagnosis of COVID-19 infection under section 564(b)(1) of  the Act, 21 U  S C  539CUG-4(Y)(4), unless the authorization is terminated  or revoked sooner  The test has been validated but independent review by FDA  and CLIA is pending  Test performed using [employer] GeneXpert: This RT-PCR assay targets N2,  a region unique to SARS-CoV-2  A conserved region in the E-gene was chosen  for pan-Sarbecovirus detection which includes SARS-CoV-2      Comprehensive metabolic panel [[id number]]  (Abnormal) Collected: 07/31/22 1537    Lab Status: Final result Specimen: Blood from Arm, Left Updated: 07/31/22 1559     Sodium 137 mmol/L      Potassium 3 9 mmol/L      Chloride 97 mmol/L      CO2 31 mmol/L      ANION GAP 9 mmol/L      BUN 5 mg/dL      Creatinine 1 07 mg/dL      Glucose 120 mg/dL      Calcium 9 0 mg/dL      AST 30 U/L      ALT 30 U/L      Alkaline Phosphatase 130 U/L      Total Protein 7 4 g/dL      Albumin 3 6 g/dL      Total Bilirubin 0 46 mg/dL      eGFR 48 ml/min/1 73sq m     Narrative:      Gilbert guidelines for Chronic Kidney Disease (CKD):     Stage 1 with normal or high GFR (GFR > 90 mL/min/1 73 square meters)    Stage 2 Mild CKD (GFR = 60-89 mL/min/1 73 square meters)    Stage 3A Moderate CKD (GFR = 45-59 mL/min/1 73 square meters)    Stage 3B Moderate CKD (GFR = 30-44 mL/min/1 73 square meters)    Stage 4 Severe CKD (GFR = 15-29 mL/min/1 73 square meters)    Stage 5 End Stage CKD (GFR <15 mL/min/1 73 square meters)  Note: GFR calculation is accurate only with a steady state creatinine    CBC and differential [[id number]]  (Abnormal) Collected: 07/31/22 1537    Lab Status: Final result Specimen: Blood from Arm, Left Updated: 07/31/22 1543     WBC 6 00 Thousand/uL      RBC 4 32 Million/uL      Hemoglobin 13 8 g/dL      Hematocrit 42 5 %      MCV 98 fL      MCH 31 9 pg      MCHC 32 5 g/dL      RDW 12 8 %      MPV 9 7 fL      Platelets 846 Thousands/uL      nRBC 0 /100 WBCs      Neutrophils Relative 70 %      Immat GRANS % 0 %      Lymphocytes Relative 15 %      Monocytes Relative 14 %      Eosinophils Relative 0 %      Basophils Relative 1 %      Neutrophils Absolute 4 21 Thousands/µL      Immature Grans Absolute 0 02 Thousand/uL      Lymphocytes Absolute 0 88 Thousands/µL      Monocytes Absolute 0 84 Thousand/µL      Eosinophils Absolute 0 01 Thousand/µL      Basophils Absolute 0 04 Thousands/µL                  No orders to display              Procedures  Procedures         ED Course                               SBIRT 20yo+    Flowsheet Row Most Recent Value   SBIRT (25 yo +)    In order to provide better care to our patients, we are screening all of our patients for alcohol and drug use  Would it be okay to ask you these screening questions? No Filed at: 07/31/2022 1436                    MDM  Number of Diagnoses or Management Options  COVID-19  Decreased appetite  Diagnosis management comments: COVID testing confirmed emergency department and patient COVID positive  Satting % on room air with no increased work of breathing and nonfocal pulmonary exam   Lab evaluation without demonstrated electrolyte or renal derangement  Had prolonged discussion with patient and patient's daughter and ultimately not desiring Paxlovid  Provided with referral to COVID follow-up team for potential monoclonal antibody therapy, discharged with verbal and written return precautions        Disposition  Final diagnoses: Decreased appetite   COVID-19     Time reflects when diagnosis was documented in both MDM as applicable and the Disposition within this note     Time User Action Codes Description Comment    7/31/2022  4:09 PM Arty Gaspar Add [Z20 822] Exposure to COVID-19 virus     7/31/2022  4:09 PM Arty Gaspar Add [R63 0] Decreased appetite     7/31/2022  4:11 PM Arty Gaspar Modify [R63 0] Decreased appetite     7/31/2022  4:11 PM Arty Gaspar Remove [Z20 822] Exposure to COVID-19 virus     7/31/2022  4:11 PM Arty Gaspar Add [U07 1] COVID-19       ED Disposition     ED Disposition   Discharge    Condition   Stable    Date/Time   Sun Jul 31, 2022  4:09 PM    [address] discharge to home/self care  Follow-up Information    None         Patient's Medications   Discharge Prescriptions    BENZONATATE (TESSALON PERLES) 100 MG CAPSULE    Take 1 capsule (100 mg total) by mouth every 8 (eight) hours       Start Date: 7/31/2022 End Date: --       Order Dose: 100 mg       Quantity: 21 capsule    Refills: 0    ONDANSETRON (ZOFRAN) 4 MG TABLET    Take 1 tablet (4 mg total) by mouth every 6 (six) hours       Start Date: 7/31/2022 End Date: --       Order Dose: 4 mg       Quantity: 12 tablet    Refills: 0       No discharge procedures on file      PDMP Review     None          ED Provider  Electronically Signed by           Americo Valderrama MD  07/31/22 9642

## 2022-08-12 ENCOUNTER — APPOINTMENT (OUTPATIENT)
Dept: RADIOLOGY | Facility: CLINIC | Age: 82
End: 2022-08-12
Payer: MEDICARE

## 2022-08-12 DIAGNOSIS — G89.29 CHRONIC LEFT-SIDED LOW BACK PAIN WITHOUT SCIATICA: ICD-10-CM

## 2022-08-12 DIAGNOSIS — M54.50 CHRONIC LEFT-SIDED LOW BACK PAIN WITHOUT SCIATICA: ICD-10-CM

## 2022-08-12 PROCEDURE — 72100 X-RAY EXAM L-S SPINE 2/3 VWS: CPT

## 2022-08-17 ENCOUNTER — HOSPITAL ENCOUNTER (OUTPATIENT)
Dept: RADIOLOGY | Facility: HOSPITAL | Age: 82
Discharge: HOME | End: 2022-08-17
Payer: MEDICARE

## 2022-08-17 DIAGNOSIS — R10.9 ABDOMINAL PAIN, UNSPECIFIED ABDOMINAL LOCATION: ICD-10-CM

## 2022-08-17 PROCEDURE — 74177 CT ABD & PELVIS W/CONTRAST: CPT

## 2022-08-17 PROCEDURE — G1004 CDSM NDSC: HCPCS

## 2022-08-17 RX ADMIN — IOHEXOL 65 ML: 350 INJECTION, SOLUTION INTRAVENOUS at 15:24
